# Patient Record
Sex: FEMALE | Race: WHITE | NOT HISPANIC OR LATINO | Employment: OTHER | URBAN - METROPOLITAN AREA
[De-identification: names, ages, dates, MRNs, and addresses within clinical notes are randomized per-mention and may not be internally consistent; named-entity substitution may affect disease eponyms.]

---

## 2018-10-03 ENCOUNTER — TRANSCRIBE ORDERS (OUTPATIENT)
Dept: LAB | Facility: CLINIC | Age: 83
End: 2018-10-03

## 2018-10-03 ENCOUNTER — APPOINTMENT (OUTPATIENT)
Dept: LAB | Facility: CLINIC | Age: 83
End: 2018-10-03
Payer: MEDICARE

## 2018-10-03 DIAGNOSIS — E03.9 HYPOTHYROIDISM, UNSPECIFIED TYPE: ICD-10-CM

## 2018-10-03 DIAGNOSIS — R29.6 FALL IN ELDERLY PATIENT: ICD-10-CM

## 2018-10-03 DIAGNOSIS — E13.8 DIABETES MELLITUS OF OTHER TYPE WITH COMPLICATION, UNSPECIFIED WHETHER LONG TERM INSULIN USE: ICD-10-CM

## 2018-10-03 DIAGNOSIS — E13.8 DIABETES MELLITUS OF OTHER TYPE WITH COMPLICATION, UNSPECIFIED WHETHER LONG TERM INSULIN USE: Primary | ICD-10-CM

## 2018-10-03 DIAGNOSIS — I10 HYPERTENSION, UNSPECIFIED TYPE: ICD-10-CM

## 2018-10-03 LAB
ALBUMIN SERPL BCP-MCNC: 3.5 G/DL (ref 3.5–5)
ALP SERPL-CCNC: 78 U/L (ref 46–116)
ALT SERPL W P-5'-P-CCNC: 37 U/L (ref 12–78)
ANION GAP SERPL CALCULATED.3IONS-SCNC: 7 MMOL/L (ref 4–13)
AST SERPL W P-5'-P-CCNC: 24 U/L (ref 5–45)
BILIRUB SERPL-MCNC: 0.7 MG/DL (ref 0.2–1)
BUN SERPL-MCNC: 20 MG/DL (ref 5–25)
CALCIUM SERPL-MCNC: 8.9 MG/DL (ref 8.3–10.1)
CHLORIDE SERPL-SCNC: 103 MMOL/L (ref 100–108)
CO2 SERPL-SCNC: 28 MMOL/L (ref 21–32)
CREAT SERPL-MCNC: 1.57 MG/DL (ref 0.6–1.3)
ERYTHROCYTE [DISTWIDTH] IN BLOOD BY AUTOMATED COUNT: 14.1 % (ref 11.6–15.1)
EST. AVERAGE GLUCOSE BLD GHB EST-MCNC: 186 MG/DL
GFR SERPL CREATININE-BSD FRML MDRD: 30 ML/MIN/1.73SQ M
GLUCOSE SERPL-MCNC: 227 MG/DL (ref 65–140)
HBA1C MFR BLD: 8.1 % (ref 4.2–6.3)
HCT VFR BLD AUTO: 36.1 % (ref 34.8–46.1)
HGB BLD-MCNC: 11.7 G/DL (ref 11.5–15.4)
MCH RBC QN AUTO: 31 PG (ref 26.8–34.3)
MCHC RBC AUTO-ENTMCNC: 32.4 G/DL (ref 31.4–37.4)
MCV RBC AUTO: 96 FL (ref 82–98)
PLATELET # BLD AUTO: 272 THOUSANDS/UL (ref 149–390)
PMV BLD AUTO: 11.9 FL (ref 8.9–12.7)
POTASSIUM SERPL-SCNC: 4.7 MMOL/L (ref 3.5–5.3)
PROT SERPL-MCNC: 7.3 G/DL (ref 6.4–8.2)
RBC # BLD AUTO: 3.77 MILLION/UL (ref 3.81–5.12)
SODIUM SERPL-SCNC: 138 MMOL/L (ref 136–145)
TSH SERPL DL<=0.05 MIU/L-ACNC: 1.24 UIU/ML (ref 0.36–3.74)
WBC # BLD AUTO: 7.62 THOUSAND/UL (ref 4.31–10.16)

## 2018-10-03 PROCEDURE — 85027 COMPLETE CBC AUTOMATED: CPT

## 2018-10-03 PROCEDURE — 80053 COMPREHEN METABOLIC PANEL: CPT

## 2018-10-03 PROCEDURE — 36415 COLL VENOUS BLD VENIPUNCTURE: CPT

## 2018-10-03 PROCEDURE — 84443 ASSAY THYROID STIM HORMONE: CPT

## 2018-10-03 PROCEDURE — 83036 HEMOGLOBIN GLYCOSYLATED A1C: CPT

## 2020-11-14 PROCEDURE — 87635 SARS-COV-2 COVID-19 AMP PRB: CPT | Performed by: INTERNAL MEDICINE

## 2020-11-15 ENCOUNTER — LAB REQUISITION (OUTPATIENT)
Dept: LAB | Facility: HOSPITAL | Age: 85
End: 2020-11-15
Payer: MEDICARE

## 2020-11-15 DIAGNOSIS — U07.1 COVID-19: ICD-10-CM

## 2020-11-15 LAB — SARS-COV-2 RNA RESP QL NAA+PROBE: NEGATIVE

## 2020-11-16 ENCOUNTER — TELEPHONE (OUTPATIENT)
Dept: UROLOGY | Facility: CLINIC | Age: 85
End: 2020-11-16

## 2020-11-17 ENCOUNTER — TELEPHONE (OUTPATIENT)
Dept: UROLOGY | Facility: CLINIC | Age: 85
End: 2020-11-17

## 2020-12-18 ENCOUNTER — LAB REQUISITION (OUTPATIENT)
Dept: LAB | Facility: HOSPITAL | Age: 85
End: 2020-12-18
Payer: MEDICARE

## 2020-12-18 DIAGNOSIS — U07.1 COVID-19: ICD-10-CM

## 2020-12-18 PROCEDURE — U0003 INFECTIOUS AGENT DETECTION BY NUCLEIC ACID (DNA OR RNA); SEVERE ACUTE RESPIRATORY SYNDROME CORONAVIRUS 2 (SARS-COV-2) (CORONAVIRUS DISEASE [COVID-19]), AMPLIFIED PROBE TECHNIQUE, MAKING USE OF HIGH THROUGHPUT TECHNOLOGIES AS DESCRIBED BY CMS-2020-01-R: HCPCS | Performed by: INTERNAL MEDICINE

## 2020-12-19 LAB — SARS-COV-2 RNA SPEC QL NAA+PROBE: NOT DETECTED

## 2021-12-03 ENCOUNTER — HOSPITAL ENCOUNTER (EMERGENCY)
Facility: HOSPITAL | Age: 86
Discharge: HOME | End: 2021-12-03
Attending: EMERGENCY MEDICINE | Admitting: EMERGENCY MEDICINE
Payer: MEDICARE

## 2021-12-03 ENCOUNTER — APPOINTMENT (EMERGENCY)
Dept: CT IMAGING | Facility: HOSPITAL | Age: 86
End: 2021-12-03
Payer: MEDICARE

## 2021-12-03 ENCOUNTER — APPOINTMENT (EMERGENCY)
Dept: RADIOLOGY | Facility: HOSPITAL | Age: 86
End: 2021-12-03
Payer: MEDICARE

## 2021-12-03 VITALS
RESPIRATION RATE: 18 BRPM | DIASTOLIC BLOOD PRESSURE: 84 MMHG | HEART RATE: 72 BPM | SYSTOLIC BLOOD PRESSURE: 213 MMHG | OXYGEN SATURATION: 95 % | TEMPERATURE: 97.8 F
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
## 2021-12-03 DIAGNOSIS — M25.562 LEFT KNEE PAIN: ICD-10-CM

## 2021-12-03 DIAGNOSIS — S00.81XA ABRASION OF FOREHEAD, INITIAL ENCOUNTER: ICD-10-CM

## 2021-12-03 PROCEDURE — 73564 X-RAY EXAM KNEE 4 OR MORE: CPT

## 2021-12-03 PROCEDURE — 73502 X-RAY EXAM HIP UNI 2-3 VIEWS: CPT

## 2021-12-03 PROCEDURE — 70450 CT HEAD/BRAIN W/O DYE: CPT

## 2021-12-03 PROCEDURE — 90471 IMMUNIZATION ADMIN: CPT

## 2021-12-03 PROCEDURE — 90715 TDAP VACCINE 7 YRS/> IM: CPT | Performed by: EMERGENCY MEDICINE

## 2021-12-03 PROCEDURE — 72125 CT NECK SPINE W/O DYE: CPT

## 2021-12-03 PROCEDURE — 99284 EMERGENCY DEPT VISIT MOD MDM: CPT

## 2021-12-03 PROCEDURE — 99284 EMERGENCY DEPT VISIT MOD MDM: CPT | Performed by: EMERGENCY MEDICINE

## 2021-12-03 RX ADMIN — TETANUS TOXOID, REDUCED DIPHTHERIA TOXOID AND ACELLULAR PERTUSSIS VACCINE, ADSORBED 0.5 ML: 5; 2.5; 8; 8; 2.5 SUSPENSION INTRAMUSCULAR at 02:56

## 2022-05-19 ENCOUNTER — HOSPITAL ENCOUNTER (EMERGENCY)
Facility: HOSPITAL | Age: 87
Discharge: HOME | End: 2022-05-19
Attending: EMERGENCY MEDICINE
Payer: MEDICARE

## 2022-05-19 ENCOUNTER — APPOINTMENT (EMERGENCY)
Dept: RADIOLOGY | Facility: HOSPITAL | Age: 87
End: 2022-05-19
Payer: MEDICARE

## 2022-05-19 ENCOUNTER — APPOINTMENT (EMERGENCY)
Dept: CT IMAGING | Facility: HOSPITAL | Age: 87
End: 2022-05-19
Payer: MEDICARE

## 2022-05-19 VITALS
RESPIRATION RATE: 18 BRPM | OXYGEN SATURATION: 96 % | HEART RATE: 66 BPM | SYSTOLIC BLOOD PRESSURE: 173 MMHG | DIASTOLIC BLOOD PRESSURE: 92 MMHG | HEIGHT: 66 IN | BODY MASS INDEX: 25.71 KG/M2 | WEIGHT: 160 LBS

## 2022-05-19 DIAGNOSIS — M25.511 BILATERAL SHOULDER PAIN: ICD-10-CM

## 2022-05-19 DIAGNOSIS — M25.512 BILATERAL SHOULDER PAIN: ICD-10-CM
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
## 2022-05-19 LAB
GLUCOSE SERPL-MCNC: 120 MG/DL (ref 65–140)
GLUCOSE SERPL-MCNC: 93 MG/DL (ref 65–140)

## 2022-05-19 PROCEDURE — 71045 X-RAY EXAM CHEST 1 VIEW: CPT

## 2022-05-19 PROCEDURE — 82948 REAGENT STRIP/BLOOD GLUCOSE: CPT

## 2022-05-19 PROCEDURE — 73030 X-RAY EXAM OF SHOULDER: CPT

## 2022-05-19 PROCEDURE — 99284 EMERGENCY DEPT VISIT MOD MDM: CPT

## 2022-05-19 PROCEDURE — 70450 CT HEAD/BRAIN W/O DYE: CPT

## 2022-05-19 PROCEDURE — 99282 EMERGENCY DEPT VISIT SF MDM: CPT | Performed by: PHYSICIAN ASSISTANT

## 2022-05-19 RX ORDER — POTASSIUM CHLORIDE 750 MG/1
10 TABLET, FILM COATED, EXTENDED RELEASE ORAL DAILY
COMMUNITY

## 2022-05-19 RX ORDER — GABAPENTIN 100 MG/1
100 CAPSULE ORAL 3 TIMES DAILY
COMMUNITY

## 2022-05-19 RX ORDER — LEVOTHYROXINE SODIUM 0.05 MG/1
50 TABLET ORAL DAILY
COMMUNITY

## 2022-05-19 RX ORDER — PIOGLITAZONEHYDROCHLORIDE 15 MG/1
15 TABLET ORAL DAILY
COMMUNITY
End: 2022-06-28

## 2022-05-19 RX ORDER — LOSARTAN POTASSIUM 50 MG/1
50 TABLET ORAL DAILY
COMMUNITY

## 2022-05-19 RX ORDER — FOLIC ACID 1 MG/1
TABLET ORAL DAILY
COMMUNITY

## 2022-05-20 NOTE — ED PROVIDER NOTES
History  Chief Complaint   Patient presents with   Suzanne Marcos fall at nursing home  May have hit her head  No thinners or ASA  EMS found blood sugar 42 and administered oral glucose  Repeat in the 50's, then repeated glucose  Per EMS patient much more alert and speaking more clearly  Patient is ZAINAB     63-year-old female presents to the emergency department from a nursing facility concern for a possible fall  Patient does have some dementia and is pleasantly confused at baseline  EMS reported to have found the patient's blood sugar to be in the 40s which improved with oral glucose  Patient is well-appearing in no acute distress  There is a small isolated skin tear to the right forearm  Superficial abrasion to the forehead  Patient does not take any blood thinning medications  Patient also expresses concern for bilateral shoulder discomfort  Allergies reviewed          Prior to Admission Medications   Prescriptions Last Dose Informant Patient Reported? Taking? Bioflavonoid Products (WHITNEY C PO)   Yes Yes   Sig: Take 1,000 mg by mouth in the morning   folic acid (FOLVITE) 1 mg tablet   Yes Yes   Sig: Take by mouth daily   gabapentin (NEURONTIN) 100 mg capsule   Yes Yes   Sig: Take 100 mg by mouth in the morning and 100 mg in the evening and 100 mg before bedtime  insulin detemir (LEVEMIR) 100 units/mL subcutaneous injection   Yes Yes   Sig: Inject 25 Units under the skin daily at bedtime   levothyroxine 50 mcg tablet   Yes Yes   Sig: Take 50 mcg by mouth in the morning  losartan (COZAAR) 50 mg tablet   Yes Yes   Sig: Take 50 mg by mouth in the morning  methotrexate 2 5 mg tablet   Yes Yes   Sig: Take 2 5 mg by mouth once a week Sundays   metoprolol tartrate (LOPRESSOR) 25 mg tablet   Yes Yes   Sig: Take 25 mg by mouth every 12 (twelve) hours   pioglitazone (ACTOS) 15 mg tablet   Yes Yes   Sig: Take 15 mg by mouth in the morning     potassium chloride (Klor-Con) 10 mEq tablet   Yes Yes Sig: Take 10 mEq by mouth in the morning      Facility-Administered Medications: None       Past Medical History:   Diagnosis Date    Dementia ([facility] )     Diabetes mellitus ([place] [address] )     Disease of thyroid gland     Hypertension     Rheumatoid arthritis ([place] [address] )        History reviewed  No pertinent surgical history  History reviewed  No pertinent family history  I have reviewed and agree with the history as documented  E-Cigarette/Vaping    E-Cigarette Use Never User      E-Cigarette/Vaping Substances    Nicotine No     THC No     CBD No     Flavoring No     Other No     Unknown No      Social History     Tobacco Use    Smoking status: Never Smoker    Smokeless tobacco: Never Used   Vaping Use    Vaping Use: Never used   Substance Use Topics    Alcohol use: Not Currently    Drug use: Never       Review of Systems   Unable to perform ROS: Dementia       Physical Exam  Physical Exam  Vitals and nursing note reviewed  Constitutional:       General: She is not in acute distress  Appearance: She is well-developed  HENT:      Head: Normocephalic and atraumatic  Eyes:      Conjunctiva/sclera: Conjunctivae normal    Cardiovascular:      Rate and Rhythm: Normal rate and regular rhythm  Heart sounds: No murmur heard  Pulmonary:      Effort: Pulmonary effort is normal  No respiratory distress  Breath sounds: Normal breath sounds  Abdominal:      Palpations: Abdomen is soft  Tenderness: There is no abdominal tenderness  Musculoskeletal:      Cervical back: Neck supple  Skin:     General: Skin is warm and dry  Comments: Superficial skin tear to the right forearm  no active bleeding  Neurological:      Mental Status: She is alert           Vital Signs  ED Triage Vitals [05/19/22 0959]   Temp Pulse Respirations Blood Pressure SpO2   -- 66 18 (!) 173/92 96 %      Temp src Heart Rate Source Patient Position - Orthostatic VS BP Location FiO2 (%)   -- Monitor Sitting Left arm --      Pain Score       --           Vitals:    05/19/22 0959   BP: (!) 173/92   Pulse: 66   Patient Position - Orthostatic VS: Sitting         Visual Acuity      ED Medications  Medications - No data to display    Diagnostic Studies  Results Reviewed     Procedure Component Value Units Date/Time    Fingerstick Glucose (POCT) [[id number]]  (Normal) Collected: 05/19/22 1140    Lab Status: Final result Updated: 05/19/22 1143     POC Glucose 120 mg/dl     Fingerstick Glucose (POCT) [[id number]]  (Normal) Collected: 05/19/22 1049    Lab Status: Final result Updated: 05/19/22 1051     POC Glucose 93 mg/dl                  XR shoulder 2+ views RIGHT   Final Result by Adonis Johnson MD (05/19 1146)      No acute osseous abnormality  Degenerative changes as described  Workstation performed: [id number]         XR chest 1 view portable   Final Result by Adonis Johnson MD (05/19 1146)      No acute cardiopulmonary disease  Workstation performed: [id number]         XR shoulder 2+ views LEFT   Final Result by Adonis Johnson MD (05/19 1145)      No acute osseous abnormality  Degenerative changes as described  Workstation performed: [id number]         CT head without contrast   Final Result by Rochelle Roland MD (05/19 1048)      Intracranial hemorrhage or mass effect  Minimal frontal scalp soft tissue swelling  Workstation performed: [id number]                    Procedures  Procedures         ED Course                               SBIRT 22yo+    Flowsheet Row Most Recent Value   SBIRT (25 yo +)    In order to provide better care to our patients, we are screening all of our patients for alcohol and drug use  Would it be okay to ask you these screening questions? Yes Filed at: 05/19/2022 1027   Initial Alcohol Screen: US AUDIT-C     1  How often do you have a drink containing alcohol? 0 Filed at: 05/19/2022 1027   2   How many drinks containing alcohol do you have on a typical day you are drinking? 0 Filed at: 05/19/2022 1027   3a  Male UNDER 65: How often do you have five or more drinks on one occasion? 0 Filed at: 05/19/2022 1027   3b  FEMALE Any Age, or MALE 65+: How often do you have 4 or more drinks on one occassion? 0 Filed at: 05/19/2022 1027   Audit-C Score 0 Filed at: 05/19/2022 1027   RAMIREZ: How many times in the past year have you    Used an illegal drug or used a prescription medication for non-medical reasons? Never Filed at: 05/19/2022 1027                    MDM  Number of Diagnoses or Management Options  Bilateral shoulder pain  Fall, initial encounter  Diagnosis management comments: Patient has arthritis of the bilateral shoulders  Superficial skin tear of the right forearm  Patient is well-appearing to reported baseline otherwise  Patient is suitable for discharge back to nursing facility  Patient educated regarding their diagnosis and given return and follow-up instructions  Patient was advised to returned to the ED with worsening symptoms or concerns  Patient is understanding of and in agreement with the treatment plan  There are no questions at the time of discharge  Risk of Complications, Morbidity, and/or Mortality  Presenting problems: low  Diagnostic procedures: low  Management options: low    Patient Progress  Patient progress: stable      Disposition  Final diagnoses:   Fall, initial encounter   Bilateral shoulder pain     Time reflects when diagnosis was documented in both MDM as applicable and the Disposition within this note     Time User Action Codes Description Comment    5/19/2022 10:59 AM Tonja Butler Add [Z24  XXXA] Fall, initial encounter     5/19/2022 10:59 AM Tonja Butler Add [M25 511,  M25 512] Bilateral shoulder pain       ED Disposition     ED Disposition   Discharge    Condition   Stable    Date/Time   Thu May 19, 2022 11:46 AM    Comment   Kiya Celis discharge to home/self care                 Follow-up Information    None         Discharge Medication List as of 5/19/2022 11:46 AM      CONTINUE these medications which have NOT CHANGED    Details   Bioflavonoid Products (WHITNEY C PO) Take 1,000 mg by mouth in the morning, Historical Med      folic acid (FOLVITE) 1 mg tablet Take by mouth daily, Historical Med      gabapentin (NEURONTIN) 100 mg capsule Take 100 mg by mouth in the morning and 100 mg in the evening and 100 mg before bedtime  , Historical Med      insulin detemir (LEVEMIR) 100 units/mL subcutaneous injection Inject 25 Units under the skin daily at bedtime, Historical Med      levothyroxine 50 mcg tablet Take 50 mcg by mouth in the morning , Historical Med      losartan (COZAAR) 50 mg tablet Take 50 mg by mouth in the morning , Historical Med      methotrexate 2 5 mg tablet Take 2 5 mg by mouth once a week Sundays, Historical Med      metoprolol tartrate (LOPRESSOR) 25 mg tablet Take 25 mg by mouth every 12 (twelve) hours, Historical Med      pioglitazone (ACTOS) 15 mg tablet Take 15 mg by mouth in the morning , Historical Med      potassium chloride (Klor-Con) 10 mEq tablet Take 10 mEq by mouth in the morning, Historical Med             No discharge procedures on file      PDMP Review     None          ED Provider  Electronically Signed by           Ted Huertas PA-C  05/20/22 8824

## 2022-06-09 PROCEDURE — U0003 INFECTIOUS AGENT DETECTION BY NUCLEIC ACID (DNA OR RNA); SEVERE ACUTE RESPIRATORY SYNDROME CORONAVIRUS 2 (SARS-COV-2) (CORONAVIRUS DISEASE [COVID-19]), AMPLIFIED PROBE TECHNIQUE, MAKING USE OF HIGH THROUGHPUT TECHNOLOGIES AS DESCRIBED BY CMS-2020-01-R: HCPCS | Performed by: INTERNAL MEDICINE

## 2022-06-09 PROCEDURE — U0005 INFEC AGEN DETEC AMPLI PROBE: HCPCS | Performed by: INTERNAL MEDICINE

## 2022-06-10 ENCOUNTER — LAB REQUISITION (OUTPATIENT)
Dept: LAB | Facility: HOSPITAL | Age: 87
End: 2022-06-10
Payer: MEDICARE

## 2022-06-10 DIAGNOSIS — Z03.818 ENCOUNTER FOR OBSERVATION FOR SUSPECTED EXPOSURE TO OTHER BIOLOGICAL AGENTS RULED OUT: ICD-10-CM

## 2022-06-10 DIAGNOSIS — Z11.59 ENCOUNTER FOR SCREENING FOR OTHER VIRAL DISEASES: ICD-10-CM

## 2022-06-10 LAB — SARS-COV-2 RNA RESP QL NAA+PROBE: NEGATIVE

## 2022-06-15 PROCEDURE — U0003 INFECTIOUS AGENT DETECTION BY NUCLEIC ACID (DNA OR RNA); SEVERE ACUTE RESPIRATORY SYNDROME CORONAVIRUS 2 (SARS-COV-2) (CORONAVIRUS DISEASE [COVID-19]), AMPLIFIED PROBE TECHNIQUE, MAKING USE OF HIGH THROUGHPUT TECHNOLOGIES AS DESCRIBED BY CMS-2020-01-R: HCPCS | Performed by: INTERNAL MEDICINE

## 2022-06-15 PROCEDURE — U0005 INFEC AGEN DETEC AMPLI PROBE: HCPCS | Performed by: INTERNAL MEDICINE

## 2022-06-16 ENCOUNTER — LAB REQUISITION (OUTPATIENT)
Dept: LAB | Facility: HOSPITAL | Age: 87
End: 2022-06-16
Payer: MEDICARE

## 2022-06-16 DIAGNOSIS — Z03.818 ENCOUNTER FOR OBSERVATION FOR SUSPECTED EXPOSURE TO OTHER BIOLOGICAL AGENTS RULED OUT: ICD-10-CM

## 2022-06-16 DIAGNOSIS — Z11.59 ENCOUNTER FOR SCREENING FOR OTHER VIRAL DISEASES: ICD-10-CM

## 2022-06-16 LAB — SARS-COV-2 RNA RESP QL NAA+PROBE: NEGATIVE

## 2022-06-27 ENCOUNTER — APPOINTMENT (EMERGENCY)
Dept: RADIOLOGY | Facility: HOSPITAL | Age: 87
End: 2022-06-27
Payer: MEDICARE

## 2022-06-27 ENCOUNTER — HOSPITAL ENCOUNTER (OUTPATIENT)
Facility: HOSPITAL | Age: 87
Setting detail: OBSERVATION
Discharge: HOME HEALTH | End: 2022-06-28
Attending: EMERGENCY MEDICINE | Admitting: STUDENT IN AN ORGANIZED HEALTH CARE EDUCATION/TRAINING PROGRAM
Payer: MEDICARE

## 2022-06-27 DIAGNOSIS — S31.000A WOUND OF SACRAL REGION, INITIAL ENCOUNTER: ICD-10-CM

## 2022-06-27 DIAGNOSIS — E11.649 HYPOGLYCEMIA DUE TO TYPE 2 DIABETES MELLITUS (HCC): ICD-10-CM

## 2022-06-27 DIAGNOSIS — R41.82 ALTERED MENTAL STATUS: ICD-10-CM

## 2022-06-27 DIAGNOSIS — D64.9 ANEMIA: ICD-10-CM

## 2022-06-27 DIAGNOSIS — E16.2 HYPOGLYCEMIA: Primary | ICD-10-CM

## 2022-06-27 DIAGNOSIS — N30.00 ACUTE CYSTITIS WITHOUT HEMATURIA: ICD-10-CM

## 2022-06-27 PROBLEM — I10 HYPERTENSION: Status: ACTIVE | Noted: 2022-06-27

## 2022-06-27 PROBLEM — N39.0 UTI (URINARY TRACT INFECTION): Status: ACTIVE | Noted: 2022-06-27

## 2022-06-27 PROBLEM — N18.9 ACUTE KIDNEY INJURY SUPERIMPOSED ON CHRONIC KIDNEY DISEASE (HCC): Status: ACTIVE | Noted: 2022-06-27

## 2022-06-27 PROBLEM — F03.90 DEMENTIA (HCC): Status: ACTIVE | Noted: 2022-06-27

## 2022-06-27 PROBLEM — M06.9 RHEUMATOID ARTHRITIS (HCC): Status: ACTIVE | Noted: 2022-06-27

## 2022-06-27 PROBLEM — N17.9 ACUTE KIDNEY INJURY SUPERIMPOSED ON CHRONIC KIDNEY DISEASE (HCC): Status: ACTIVE | Noted: 2022-06-27

## 2022-06-27 LAB
2HR DELTA HS TROPONIN: -1 NG/L
4HR DELTA HS TROPONIN: -7 NG/L
ANION GAP SERPL CALCULATED.3IONS-SCNC: 5 MMOL/L (ref 4–13)
ATRIAL RATE: 65 BPM
ATRIAL RATE: 69 BPM
ATRIAL RATE: 71 BPM
BACTERIA UR QL AUTO: ABNORMAL /HPF
BASOPHILS # BLD AUTO: 0.02 THOUSANDS/ΜL (ref 0–0.1)
BASOPHILS NFR BLD AUTO: 0 % (ref 0–1)
BILIRUB UR QL STRIP: NEGATIVE
BUN SERPL-MCNC: 44 MG/DL (ref 5–25)
CALCIUM SERPL-MCNC: 8.2 MG/DL (ref 8.4–10.2)
CARDIAC TROPONIN I PNL SERPL HS: 66 NG/L
CARDIAC TROPONIN I PNL SERPL HS: 72 NG/L
CARDIAC TROPONIN I PNL SERPL HS: 73 NG/L
CHLORIDE SERPL-SCNC: 106 MMOL/L (ref 96–108)
CLARITY UR: ABNORMAL
CO2 SERPL-SCNC: 25 MMOL/L (ref 21–32)
COLOR UR: ABNORMAL
CREAT SERPL-MCNC: 1.98 MG/DL (ref 0.6–1.3)
EOSINOPHIL # BLD AUTO: 0 THOUSAND/ΜL (ref 0–0.61)
EOSINOPHIL NFR BLD AUTO: 0 % (ref 0–6)
ERYTHROCYTE [DISTWIDTH] IN BLOOD BY AUTOMATED COUNT: 16.2 % (ref 11.6–15.1)
EST. AVERAGE GLUCOSE BLD GHB EST-MCNC: 169 MG/DL
FERRITIN SERPL-MCNC: 202 NG/ML (ref 8–388)
FOLATE SERPL-MCNC: >20 NG/ML (ref 3.1–17.5)
GFR SERPL CREATININE-BSD FRML MDRD: 22 ML/MIN/1.73SQ M
GLUCOSE SERPL-MCNC: 125 MG/DL (ref 65–140)
GLUCOSE SERPL-MCNC: 133 MG/DL (ref 65–140)
GLUCOSE SERPL-MCNC: 137 MG/DL (ref 65–140)
GLUCOSE SERPL-MCNC: 202 MG/DL (ref 65–140)
GLUCOSE SERPL-MCNC: 217 MG/DL (ref 65–140)
GLUCOSE SERPL-MCNC: 251 MG/DL (ref 65–140)
GLUCOSE SERPL-MCNC: 275 MG/DL (ref 65–140)
GLUCOSE SERPL-MCNC: 280 MG/DL (ref 65–140)
GLUCOSE SERPL-MCNC: 61 MG/DL (ref 65–140)
GLUCOSE UR STRIP-MCNC: NEGATIVE MG/DL
HBA1C MFR BLD: 7.5 %
HCT VFR BLD AUTO: 28.3 % (ref 34.8–46.1)
HGB BLD-MCNC: 8.6 G/DL (ref 11.5–15.4)
HGB UR QL STRIP.AUTO: NEGATIVE
IMM GRANULOCYTES # BLD AUTO: 0.13 THOUSAND/UL (ref 0–0.2)
IMM GRANULOCYTES NFR BLD AUTO: 2 % (ref 0–2)
IRON SATN MFR SERPL: 13 % (ref 15–50)
IRON SERPL-MCNC: 32 UG/DL (ref 50–170)
KETONES UR STRIP-MCNC: NEGATIVE MG/DL
LEUKOCYTE ESTERASE UR QL STRIP: ABNORMAL
LYMPHOCYTES # BLD AUTO: 0.83 THOUSANDS/ΜL (ref 0.6–4.47)
LYMPHOCYTES NFR BLD AUTO: 11 % (ref 14–44)
MCH RBC QN AUTO: 28.5 PG (ref 26.8–34.3)
MCHC RBC AUTO-ENTMCNC: 30.4 G/DL (ref 31.4–37.4)
MCV RBC AUTO: 94 FL (ref 82–98)
MONOCYTES # BLD AUTO: 0.41 THOUSAND/ΜL (ref 0.17–1.22)
MONOCYTES NFR BLD AUTO: 6 % (ref 4–12)
NEUTROPHILS # BLD AUTO: 6.03 THOUSANDS/ΜL (ref 1.85–7.62)
NEUTS SEG NFR BLD AUTO: 81 % (ref 43–75)
NITRITE UR QL STRIP: POSITIVE
NON-SQ EPI CELLS URNS QL MICRO: ABNORMAL /HPF
NRBC BLD AUTO-RTO: 0 /100 WBCS
P AXIS: 72 DEGREES
P AXIS: 81 DEGREES
P AXIS: 87 DEGREES
PH UR STRIP.AUTO: 6 [PH]
PLATELET # BLD AUTO: 261 THOUSANDS/UL (ref 149–390)
PLATELET # BLD AUTO: 270 THOUSANDS/UL (ref 149–390)
PMV BLD AUTO: 9.6 FL (ref 8.9–12.7)
PMV BLD AUTO: 9.7 FL (ref 8.9–12.7)
POTASSIUM SERPL-SCNC: 4.1 MMOL/L (ref 3.5–5.3)
PR INTERVAL: 192 MS
PR INTERVAL: 194 MS
PR INTERVAL: 202 MS
PROT UR STRIP-MCNC: NEGATIVE MG/DL
QRS AXIS: -31 DEGREES
QRS AXIS: -31 DEGREES
QRS AXIS: -44 DEGREES
QRSD INTERVAL: 146 MS
QRSD INTERVAL: 150 MS
QRSD INTERVAL: 154 MS
QT INTERVAL: 478 MS
QT INTERVAL: 480 MS
QT INTERVAL: 490 MS
QTC INTERVAL: 509 MS
QTC INTERVAL: 512 MS
QTC INTERVAL: 521 MS
RBC # BLD AUTO: 3.02 MILLION/UL (ref 3.81–5.12)
RBC #/AREA URNS AUTO: ABNORMAL /HPF
SODIUM SERPL-SCNC: 136 MMOL/L (ref 135–147)
SP GR UR STRIP.AUTO: 1.02 (ref 1–1.03)
T WAVE AXIS: 35 DEGREES
T WAVE AXIS: 53 DEGREES
T WAVE AXIS: 62 DEGREES
TIBC SERPL-MCNC: 242 UG/DL (ref 250–450)
TSH SERPL DL<=0.05 MIU/L-ACNC: 1.66 UIU/ML (ref 0.45–4.5)
UROBILINOGEN UR QL STRIP.AUTO: 0.2 E.U./DL
VENTRICULAR RATE: 65 BPM
VENTRICULAR RATE: 69 BPM
VENTRICULAR RATE: 71 BPM
VIT B12 SERPL-MCNC: 331 PG/ML (ref 100–900)
WBC # BLD AUTO: 7.42 THOUSAND/UL (ref 4.31–10.16)
WBC #/AREA URNS AUTO: ABNORMAL /HPF

## 2022-06-27 PROCEDURE — 82948 REAGENT STRIP/BLOOD GLUCOSE: CPT

## 2022-06-27 PROCEDURE — 93010 ELECTROCARDIOGRAM REPORT: CPT | Performed by: INTERNAL MEDICINE

## 2022-06-27 PROCEDURE — 99285 EMERGENCY DEPT VISIT HI MDM: CPT

## 2022-06-27 PROCEDURE — 82746 ASSAY OF FOLIC ACID SERUM: CPT | Performed by: STUDENT IN AN ORGANIZED HEALTH CARE EDUCATION/TRAINING PROGRAM

## 2022-06-27 PROCEDURE — 87086 URINE CULTURE/COLONY COUNT: CPT | Performed by: EMERGENCY MEDICINE

## 2022-06-27 PROCEDURE — 87077 CULTURE AEROBIC IDENTIFY: CPT | Performed by: EMERGENCY MEDICINE

## 2022-06-27 PROCEDURE — 85025 COMPLETE CBC W/AUTO DIFF WBC: CPT | Performed by: EMERGENCY MEDICINE

## 2022-06-27 PROCEDURE — 99220 PR INITIAL OBSERVATION CARE/DAY 70 MINUTES: CPT | Performed by: STUDENT IN AN ORGANIZED HEALTH CARE EDUCATION/TRAINING PROGRAM

## 2022-06-27 PROCEDURE — 82728 ASSAY OF FERRITIN: CPT | Performed by: STUDENT IN AN ORGANIZED HEALTH CARE EDUCATION/TRAINING PROGRAM

## 2022-06-27 PROCEDURE — 93005 ELECTROCARDIOGRAM TRACING: CPT

## 2022-06-27 PROCEDURE — 71045 X-RAY EXAM CHEST 1 VIEW: CPT

## 2022-06-27 PROCEDURE — 36415 COLL VENOUS BLD VENIPUNCTURE: CPT | Performed by: EMERGENCY MEDICINE

## 2022-06-27 PROCEDURE — 87186 SC STD MICRODIL/AGAR DIL: CPT | Performed by: EMERGENCY MEDICINE

## 2022-06-27 PROCEDURE — 81001 URINALYSIS AUTO W/SCOPE: CPT | Performed by: EMERGENCY MEDICINE

## 2022-06-27 PROCEDURE — 99285 EMERGENCY DEPT VISIT HI MDM: CPT | Performed by: EMERGENCY MEDICINE

## 2022-06-27 PROCEDURE — 85049 AUTOMATED PLATELET COUNT: CPT | Performed by: STUDENT IN AN ORGANIZED HEALTH CARE EDUCATION/TRAINING PROGRAM

## 2022-06-27 PROCEDURE — 84484 ASSAY OF TROPONIN QUANT: CPT | Performed by: EMERGENCY MEDICINE

## 2022-06-27 PROCEDURE — 83540 ASSAY OF IRON: CPT | Performed by: STUDENT IN AN ORGANIZED HEALTH CARE EDUCATION/TRAINING PROGRAM

## 2022-06-27 PROCEDURE — 84443 ASSAY THYROID STIM HORMONE: CPT | Performed by: EMERGENCY MEDICINE

## 2022-06-27 PROCEDURE — 82607 VITAMIN B-12: CPT | Performed by: STUDENT IN AN ORGANIZED HEALTH CARE EDUCATION/TRAINING PROGRAM

## 2022-06-27 PROCEDURE — 80048 BASIC METABOLIC PNL TOTAL CA: CPT | Performed by: EMERGENCY MEDICINE

## 2022-06-27 PROCEDURE — 83550 IRON BINDING TEST: CPT | Performed by: STUDENT IN AN ORGANIZED HEALTH CARE EDUCATION/TRAINING PROGRAM

## 2022-06-27 PROCEDURE — 83036 HEMOGLOBIN GLYCOSYLATED A1C: CPT | Performed by: STUDENT IN AN ORGANIZED HEALTH CARE EDUCATION/TRAINING PROGRAM

## 2022-06-27 RX ORDER — LEVOTHYROXINE SODIUM 0.03 MG/1
50 TABLET ORAL
Status: DISCONTINUED | OUTPATIENT
Start: 2022-06-28 | End: 2022-06-28 | Stop reason: HOSPADM

## 2022-06-27 RX ORDER — HYDROXYCHLOROQUINE SULFATE 200 MG/1
200 TABLET, FILM COATED ORAL 2 TIMES DAILY WITH MEALS
COMMUNITY

## 2022-06-27 RX ORDER — HEPARIN SODIUM 5000 [USP'U]/ML
5000 INJECTION, SOLUTION INTRAVENOUS; SUBCUTANEOUS EVERY 8 HOURS SCHEDULED
Status: DISCONTINUED | OUTPATIENT
Start: 2022-06-27 | End: 2022-06-28 | Stop reason: HOSPADM

## 2022-06-27 RX ORDER — DEXTROSE MONOHYDRATE 100 MG/ML
250 INJECTION, SOLUTION INTRAVENOUS ONCE
Status: COMPLETED | OUTPATIENT
Start: 2022-06-27 | End: 2022-06-27

## 2022-06-27 RX ORDER — HYDRALAZINE HYDROCHLORIDE 20 MG/ML
10 INJECTION INTRAMUSCULAR; INTRAVENOUS EVERY 4 HOURS PRN
Status: DISCONTINUED | OUTPATIENT
Start: 2022-06-27 | End: 2022-06-28 | Stop reason: HOSPADM

## 2022-06-27 RX ORDER — INSULIN LISPRO 100 [IU]/ML
1-5 INJECTION, SOLUTION INTRAVENOUS; SUBCUTANEOUS
Status: DISCONTINUED | OUTPATIENT
Start: 2022-06-27 | End: 2022-06-28 | Stop reason: HOSPADM

## 2022-06-27 RX ORDER — IBUPROFEN 200 MG
200 TABLET ORAL EVERY 6 HOURS PRN
COMMUNITY
End: 2022-06-28

## 2022-06-27 RX ORDER — GABAPENTIN 100 MG/1
100 CAPSULE ORAL 3 TIMES DAILY
Status: DISCONTINUED | OUTPATIENT
Start: 2022-06-27 | End: 2022-06-28 | Stop reason: HOSPADM

## 2022-06-27 RX ORDER — CEFTRIAXONE 1 G/50ML
1000 INJECTION, SOLUTION INTRAVENOUS EVERY 24 HOURS
Status: DISCONTINUED | OUTPATIENT
Start: 2022-06-27 | End: 2022-06-27

## 2022-06-27 RX ORDER — FUROSEMIDE 20 MG/1
20 TABLET ORAL DAILY
COMMUNITY

## 2022-06-27 RX ORDER — CEFTRIAXONE 1 G/50ML
1000 INJECTION, SOLUTION INTRAVENOUS EVERY 24 HOURS
Status: DISCONTINUED | OUTPATIENT
Start: 2022-06-28 | End: 2022-06-28 | Stop reason: HOSPADM

## 2022-06-27 RX ORDER — ACETAMINOPHEN 500 MG
500 TABLET ORAL EVERY 6 HOURS PRN
COMMUNITY

## 2022-06-27 RX ORDER — POTASSIUM CHLORIDE 20MEQ/15ML
10 LIQUID (ML) ORAL DAILY
Status: DISCONTINUED | OUTPATIENT
Start: 2022-06-27 | End: 2022-06-28 | Stop reason: HOSPADM

## 2022-06-27 RX ORDER — PIOGLITAZONEHYDROCHLORIDE 15 MG/1
15 TABLET ORAL DAILY
Status: DISCONTINUED | OUTPATIENT
Start: 2022-06-27 | End: 2022-06-28

## 2022-06-27 RX ORDER — FOLIC ACID 1 MG/1
1 TABLET ORAL DAILY
Status: DISCONTINUED | OUTPATIENT
Start: 2022-06-27 | End: 2022-06-28 | Stop reason: HOSPADM

## 2022-06-27 RX ORDER — BLOOD-GLUCOSE METER
KIT MISCELLANEOUS
COMMUNITY
Start: 2022-02-18 | End: 2022-06-28 | Stop reason: SDUPTHER

## 2022-06-27 RX ORDER — LOSARTAN POTASSIUM 50 MG/1
50 TABLET ORAL DAILY
Status: DISCONTINUED | OUTPATIENT
Start: 2022-06-27 | End: 2022-06-27

## 2022-06-27 RX ORDER — FUROSEMIDE 40 MG/1
20 TABLET ORAL DAILY
Status: DISCONTINUED | OUTPATIENT
Start: 2022-06-27 | End: 2022-06-27

## 2022-06-27 RX ADMIN — DEXTROSE MONOHYDRATE 250 ML: 100 INJECTION, SOLUTION INTRAVENOUS at 09:09

## 2022-06-27 RX ADMIN — PIOGLITAZONE 15 MG: 15 TABLET ORAL at 12:04

## 2022-06-27 RX ADMIN — METOPROLOL TARTRATE 25 MG: 25 TABLET, FILM COATED ORAL at 12:04

## 2022-06-27 RX ADMIN — INSULIN LISPRO 2 UNITS: 100 INJECTION, SOLUTION INTRAVENOUS; SUBCUTANEOUS at 17:28

## 2022-06-27 RX ADMIN — HEPARIN SODIUM 5000 UNITS: 5000 INJECTION INTRAVENOUS; SUBCUTANEOUS at 15:38

## 2022-06-27 RX ADMIN — GABAPENTIN 100 MG: 100 CAPSULE ORAL at 21:35

## 2022-06-27 RX ADMIN — INSULIN DETEMIR 5 UNITS: 100 INJECTION, SOLUTION SUBCUTANEOUS at 21:36

## 2022-06-27 RX ADMIN — GABAPENTIN 100 MG: 100 CAPSULE ORAL at 17:00

## 2022-06-27 RX ADMIN — POTASSIUM CHLORIDE 10 MEQ: 20 SOLUTION ORAL at 12:09

## 2022-06-27 RX ADMIN — FOLIC ACID 1 MG: 1 TABLET ORAL at 12:05

## 2022-06-27 RX ADMIN — HEPARIN SODIUM 5000 UNITS: 5000 INJECTION INTRAVENOUS; SUBCUTANEOUS at 21:35

## 2022-06-27 RX ADMIN — GABAPENTIN 100 MG: 100 CAPSULE ORAL at 12:04

## 2022-06-27 RX ADMIN — INSULIN LISPRO 1 UNITS: 100 INJECTION, SOLUTION INTRAVENOUS; SUBCUTANEOUS at 12:03

## 2022-06-27 RX ADMIN — METOPROLOL TARTRATE 25 MG: 25 TABLET, FILM COATED ORAL at 21:47

## 2022-06-27 NOTE — ED NOTES
Pt ate and toelrated 100% of food and beverage provided to her (jello, 4 ozs OJ, and applesauce)  Assisted OOB to [facility] to void  Pt with noted incontinence of urine in diaper  Diaper changed and patient cleansed for same  Skin cream applied to sacral and buttock areas as she is notably red and has a small open area on her right sacrum        Isidro Squires RN  06/27/22 8067

## 2022-06-27 NOTE — ED PROVIDER NOTES
History  Chief Complaint   Patient presents with    Hypoglycemia - Symptomatic     Unesponsive at nursing home, BGL 27, EMS gave D10 and BGL improved to 150, pt becoming more responsive     63-year-old female presenting for reports of altered mental status from personal care facility  Reportedly found have a blood glucose of 27 this morning  EMS gave 250mLs of D10 with improvement of symptoms  Denies any other issues of sick symptoms  Prior to Admission Medications   Prescriptions Last Dose Informant Patient Reported? Taking? Bioflavonoid Products (HWITNEY C PO) 6/26/2022 at Unknown time  Yes Yes   Sig: Take 1,000 mg by mouth in the morning   Insulin Pen Needle 30G X 8 MM MISC   Yes Yes   Sig: TO BE USED WITH LEVEMIR INSULIN AT BEDTIME   Loperamide-Simethicone (IMODIUM MULTI-SYMPTOM RELIEF PO)   Yes Yes   Sig: Take 1 capsule by mouth Not to exceed 8 tablets/24 hours   acetaminophen (TYLENOL) 500 mg tablet   Yes Yes   Sig: Take 500 mg by mouth every 6 (six) hours as needed for mild pain   folic acid (FOLVITE) 1 mg tablet 6/26/2022 at Unknown time  Yes Yes   Sig: Take by mouth daily   furosemide (LASIX) 20 mg tablet 6/26/2022 at Unknown time  Yes Yes   Sig: Take 20 mg by mouth daily   gabapentin (NEURONTIN) 100 mg capsule 6/26/2022 at Unknown time  Yes Yes   Sig: Take 100 mg by mouth in the morning and 100 mg in the evening and 100 mg before bedtime     glucose blood (Glucocard Vital Test) test strip   Yes Yes   Sig: USE AS DIRECTED FOR BLOOD GLUCOSE TESTING 3 TIMES DAILY   hydroxychloroquine (PLAQUENIL) 200 mg tablet 6/26/2022 at Unknown time  Yes Yes   Sig: Take 200 mg by mouth 2 (two) times a day with meals   ibuprofen (MOTRIN) 200 mg tablet   Yes Yes   Sig: Take 200 mg by mouth every 6 (six) hours as needed for mild pain   insulin NPH-insulin regular (NovoLIN 70/30) 100 units/mL subcutaneous injection 6/26/2022 at Unknown time  Yes Yes   Sig: Inject under the skin 3 (three) times a day before meals Blood sugar check 3x daily and cover according to the following sliding scale:  <160+0 units  161-220=3 units  221-280= 6 units  281-340= 9 units  >341= 12 units   insulin detemir (LEVEMIR) 100 units/mL subcutaneous injection 6/26/2022 at Unknown time  Yes Yes   Sig: Inject 25 Units under the skin daily at bedtime   levothyroxine 50 mcg tablet 6/27/2022 at Unknown time  Yes Yes   Sig: Take 50 mcg by mouth in the morning  losartan (COZAAR) 50 mg tablet 6/26/2022 at Unknown time  Yes Yes   Sig: Take 50 mg by mouth in the morning  methotrexate 2 5 mg tablet 6/26/2022  Yes Yes   Sig: Take 2 5 mg by mouth once a week Sundays   metoprolol tartrate (LOPRESSOR) 25 mg tablet 6/26/2022 at Unknown time  Yes Yes   Sig: Take 25 mg by mouth every 12 (twelve) hours   pioglitazone (ACTOS) 15 mg tablet 6/26/2022 at Unknown time  Yes Yes   Sig: Take 15 mg by mouth in the morning  potassium chloride (Klor-Con) 10 mEq tablet 6/26/2022 at Unknown time  Yes Yes   Sig: Take 10 mEq by mouth in the morning      Facility-Administered Medications: None       Past Medical History:   Diagnosis Date    Dementia [facility] )     Diabetes mellitus ([facility] )     Disease of thyroid gland     Hypertension     Rheumatoid arthritis ([facility] )        History reviewed  No pertinent surgical history  History reviewed  No pertinent family history  I have reviewed and agree with the history as documented  E-Cigarette/Vaping    E-Cigarette Use Never User      E-Cigarette/Vaping Substances    Nicotine No     THC No     CBD No     Flavoring No     Other No     Unknown No      Social History     Tobacco Use    Smoking status: Never Smoker    Smokeless tobacco: Never Used   Vaping Use    Vaping Use: Never used   Substance Use Topics    Alcohol use: Not Currently    Drug use: Never       Review of Systems   Psychiatric/Behavioral: Positive for confusion  All other systems reviewed and are negative        Physical Exam  Physical Exam  Vitals and nursing note reviewed  Constitutional:       General: She is not in acute distress  Appearance: She is well-developed  She is not ill-appearing, toxic-appearing or diaphoretic  HENT:      Head: Normocephalic and atraumatic  Right Ear: External ear normal       Left Ear: External ear normal       Nose: Nose normal    Eyes:      General: No scleral icterus  Right eye: No discharge  Left eye: No discharge  Conjunctiva/sclera: Conjunctivae normal       Pupils: Pupils are equal, round, and reactive to light  Neck:      Vascular: No JVD  Trachea: No tracheal deviation  Cardiovascular:      Rate and Rhythm: Normal rate and regular rhythm  Heart sounds: Normal heart sounds  No murmur heard  No friction rub  No gallop  Pulmonary:      Effort: Pulmonary effort is normal  No respiratory distress  Breath sounds: Normal breath sounds  No stridor  No wheezing or rales  Abdominal:      General: Bowel sounds are normal  There is no distension  Palpations: Abdomen is soft  There is no mass  Tenderness: There is no abdominal tenderness  There is no guarding  Genitourinary:     Rectum: Normal  Guaiac result negative  No mass, tenderness, anal fissure, external hemorrhoid or internal hemorrhoid  Normal anal tone  Comments: Chaperone: Yuliet Garcia RN  Musculoskeletal:         General: No tenderness or deformity  Normal range of motion  Cervical back: Normal range of motion and neck supple  Skin:     General: Skin is warm and dry  Coloration: Skin is not pale  Findings: No erythema or rash  Neurological:      Mental Status: She is alert  Cranial Nerves: No cranial nerve deficit  Sensory: No sensory deficit  Motor: No abnormal muscle tone        Comments: Baseline dementia  Alert and oriented to person and place (unaware of year which per son is normal)  Moving all extremities equally and without difficulty  No CN deficits noted   Psychiatric:         Behavior: Behavior normal          Thought Content:  Thought content normal          Judgment: Judgment normal          Vital Signs  ED Triage Vitals   Temperature Pulse Respirations Blood Pressure SpO2   06/27/22 0658 06/27/22 0658 06/27/22 0658 06/27/22 0658 06/27/22 0658   97 6 °F (36 4 °C) 70 16 155/70 96 %      Temp Source Heart Rate Source Patient Position - Orthostatic VS BP Location FiO2 (%)   06/27/22 0658 06/27/22 0658 06/27/22 1000 06/27/22 1000 --   Oral Monitor Lying Left arm       Pain Score       06/27/22 0658       No Pain           Vitals:    06/27/22 0658 06/27/22 1000 06/27/22 1204 06/27/22 1215   BP: 155/70 (!) 174/73 (!) 194/82 (!) 194/82   Pulse: 70 62 68 69   Patient Position - Orthostatic VS:  Lying  Lying         Visual Acuity      ED Medications  Medications   metoprolol tartrate (LOPRESSOR) tablet 25 mg (25 mg Oral Given 6/27/22 1204)   pioglitazone (ACTOS) tablet 15 mg (15 mg Oral Given 6/27/22 1204)   potassium chloride oral solution 10 mEq (10 mEq Oral Given 6/27/22 1209)   levothyroxine tablet 50 mcg (has no administration in time range)   folic acid (FOLVITE) tablet 1 mg (1 mg Oral Given 6/27/22 1205)   gabapentin (NEURONTIN) capsule 100 mg (100 mg Oral Given 6/27/22 1204)   insulin detemir (LEVEMIR) subcutaneous injection 5 Units (has no administration in time range)   heparin (porcine) subcutaneous injection 5,000 Units (has no administration in time range)   cefTRIAXone (ROCEPHIN) IVPB (premix in dextrose) 1,000 mg 50 mL (has no administration in time range)   insulin lispro (HumaLOG) 100 units/mL subcutaneous injection 1-5 Units (1 Units Subcutaneous Given 6/27/22 1203)   insulin lispro (HumaLOG) 100 units/mL subcutaneous injection 1-5 Units (has no administration in time range)   hydrALAZINE (APRESOLINE) injection 10 mg (has no administration in time range)   dextrose infusion 10 % (0 mL Intravenous Stopped 6/27/22 1145)       Diagnostic Studies  Results Reviewed     Procedure Component Value Units Date/Time    HS Troponin I 4hr [[id number]]  (Abnormal) Collected: 06/27/22 1216    Lab Status: Final result Specimen: Blood from Arm, Left Updated: 06/27/22 1250     hs TnI 4hr 66 ng/L      Delta 4hr hsTnI -7 ng/L     Platelet count [[id number]]  (Normal) Collected: 06/27/22 1216    Lab Status: Final result Specimen: Blood from Arm, Left Updated: 06/27/22 1225     Platelets 497 Thousands/uL      MPV 9 6 fL     Hemoglobin A1C [[id number]] Collected: 06/27/22 1216    Lab Status: In process Specimen: Blood from Arm, Left Updated: 06/27/22 1222    Folate [[id number]] Collected: 06/27/22 1216    Lab Status: In process Specimen: Blood from Arm, Left Updated: 06/27/22 1222    Vitamin B12 [[id number]] Collected: 06/27/22 1216    Lab Status: In process Specimen: Blood from Arm, Left Updated: 06/27/22 1222    Iron Saturation % [[id number]] Collected: 06/27/22 1216    Lab Status: In process Specimen: Blood from Arm, Left Updated: 06/27/22 1222    Ferritin [[id number]] Collected: 06/27/22 1216    Lab Status: In process Specimen: Blood from Arm, Left Updated: 06/27/22 1222    Fingerstick Glucose (POCT) [[id number]]  (Abnormal) Collected: 06/27/22 1157    Lab Status: Final result Updated: 06/27/22 1158     POC Glucose 202 mg/dl     Fingerstick Glucose (POCT) [[id number]]  (Normal) Collected: 06/27/22 1112    Lab Status: Final result Updated: 06/27/22 1114     POC Glucose 125 mg/dl     Occult blood 1-3, stool [[id number]]     Lab Status: No result Specimen: Stool     Urine Microscopic [[id number]]  (Abnormal) Collected: 06/27/22 0930    Lab Status: Final result Specimen: Urine, Clean Catch Updated: 06/27/22 1003     RBC, UA 0-1 /hpf      WBC, UA 20-30 /hpf      Epithelial Cells Occasional /hpf      Bacteria, UA Innumerable /hpf     Urine culture [[id number]] Collected: 06/27/22 0930    Lab Status:  In process Specimen: Urine, Clean Catch Updated: 06/27/22 1003    HS Troponin I 2hr [[id number]]  (Abnormal) Collected: 06/27/22 0912    Lab Status: Final result Specimen: Blood from Arm, Left Updated: 06/27/22 1000     hs TnI 2hr 72 ng/L      Delta 2hr hsTnI -1 ng/L     UA w Reflex to Microscopic w Reflex to Culture [[id number]]  (Abnormal) Collected: 06/27/22 0930    Lab Status: Final result Specimen: Urine, Clean Catch Updated: 06/27/22 0941     Color, UA Straw     Clarity, UA Hazy     Specific Thermal, UA 1 020     pH, UA 6 0     Leukocytes, UA 1+     Nitrite, UA Positive     Protein, UA Negative mg/dl      Glucose, UA Negative mg/dl      Ketones, UA Negative mg/dl      Urobilinogen, UA 0 2 E U /dl      Bilirubin, UA Negative     Occult Blood, UA Negative    Fingerstick Glucose (POCT) [[id number]]  (Abnormal) Collected: 06/27/22 0901    Lab Status: Final result Updated: 06/27/22 0902     POC Glucose 61 mg/dl     TSH, 3rd generation with Free T4 reflex [[id number]]  (Normal) Collected: 06/27/22 0721    Lab Status: Final result Specimen: Blood from Arm, Left Updated: 06/27/22 0803     TSH 3RD GENERATON 1 661 uIU/mL     Narrative:      Patients undergoing fluorescein dye angiography may retain small amounts of fluorescein in the body for 48-72 hours post procedure  Samples containing fluorescein can produce falsely depressed TSH values  If the patient had this procedure,a specimen should be resubmitted post fluorescein clearance        HS Troponin 0hr (reflex protocol) [[id number]]  (Abnormal) Collected: 06/27/22 0721    Lab Status: Final result Specimen: Blood from Arm, Left Updated: 06/27/22 0754     hs TnI 0hr 73 ng/L     Basic metabolic panel [[id number]]  (Abnormal) Collected: 06/27/22 0721    Lab Status: Final result Specimen: Blood from Arm, Left Updated: 06/27/22 0748     Sodium 136 mmol/L      Potassium 4 1 mmol/L      Chloride 106 mmol/L      CO2 25 mmol/L      ANION GAP 5 mmol/L      BUN 44 mg/dL      Creatinine 1 98 mg/dL      Glucose 137 mg/dL      Calcium 8 2 mg/dL      eGFR 22 ml/min/1 73sq m     Narrative:      National Kidney Disease Foundation guidelines for Chronic Kidney Disease (CKD):     Stage 1 with normal or high GFR (GFR > 90 mL/min/1 73 square meters)    Stage 2 Mild CKD (GFR = 60-89 mL/min/1 73 square meters)    Stage 3A Moderate CKD (GFR = 45-59 mL/min/1 73 square meters)    Stage 3B Moderate CKD (GFR = 30-44 mL/min/1 73 square meters)    Stage 4 Severe CKD (GFR = 15-29 mL/min/1 73 square meters)    Stage 5 End Stage CKD (GFR <15 mL/min/1 73 square meters)  Note: GFR calculation is accurate only with a steady state creatinine    CBC and differential [[id number]]  (Abnormal) Collected: 06/27/22 0721    Lab Status: Final result Specimen: Blood from Arm, Left Updated: 06/27/22 0733     WBC 7 42 Thousand/uL      RBC 3 02 Million/uL      Hemoglobin 8 6 g/dL      Hematocrit 28 3 %      MCV 94 fL      MCH 28 5 pg      MCHC 30 4 g/dL      RDW 16 2 %      MPV 9 7 fL      Platelets 097 Thousands/uL      nRBC 0 /100 WBCs      Neutrophils Relative 81 %      Immat GRANS % 2 %      Lymphocytes Relative 11 %      Monocytes Relative 6 %      Eosinophils Relative 0 %      Basophils Relative 0 %      Neutrophils Absolute 6 03 Thousands/µL      Immature Grans Absolute 0 13 Thousand/uL      Lymphocytes Absolute 0 83 Thousands/µL      Monocytes Absolute 0 41 Thousand/µL      Eosinophils Absolute 0 00 Thousand/µL      Basophils Absolute 0 02 Thousands/µL     Fingerstick Glucose (POCT) [[id number]]  (Normal) Collected: 06/27/22 0655    Lab Status: Final result Updated: 06/27/22 0702     POC Glucose 133 mg/dl                  XR chest 1 view portable   Final Result by Bev Kinney MD (06/27 0993)      Question mild pulmonary venous congestion                    Workstation performed: [id number]                    Procedures  ECG 12 Lead Documentation Only    Date/Time: 6/27/2022 3:11 PM  Performed by: Graciela Torres DO  Authorized by: Graciela Torres DO     Interpretation: Interpretation: abnormal    Rate:     ECG rate:  71    ECG rate assessment: normal    Rhythm:     Rhythm: sinus rhythm    Ectopy:     Ectopy: none    QRS:     QRS axis:  Left    QRS intervals: Wide  Conduction:     Conduction: abnormal      Abnormal conduction: complete RBBB    ST segments:     ST segments:  Normal  T waves:     T waves: inverted      Inverted:  V1 and V2             ED Course  ED Course as of 06/27/22 1514   Mon Jun 27, 2022   Kiara Rodriguez number [phone] ([phone])  Spoke with: Wing William in this morning to check on her glucose and weren't able to get reading initially and on second machine sugar 27  Staff state that she used to snack a lot overnight but that she was spiking her blood sugar at night so they took away her snacks and now her blood sugar the last 2 days have been intermittently low  Denies any obvious sick symptoms or falls  0736 Hemoglobin(!): 8 6  Steady decreased over last 1 year   0800 Spoke with patient's son Madan Green Most Recent Value   Heart Score Risk Calculator    History 0 Filed at: 06/27/2022 1514   ECG 1 Filed at: 06/27/2022 1514   Age 2 Filed at: 06/27/2022 1514   Risk Factors 2 Filed at: 06/27/2022 1514   Troponin 2 Filed at: 06/27/2022 1514   HEART Score 7 Filed at: 06/27/2022 1514                        SBIRT 20yo+    Flowsheet Row Most Recent Value   SBIRT (25 yo +)    In order to provide better care to our patients, we are screening all of our patients for alcohol and drug use  Would it be okay to ask you these screening questions? Yes Filed at: 06/27/2022 4613   Initial Alcohol Screen: US AUDIT-C     1  How often do you have a drink containing alcohol? 0 Filed at: 06/27/2022 0726   2  How many drinks containing alcohol do you have on a typical day you are drinking? 0 Filed at: 06/27/2022 0726   3a  Male UNDER 65: How often do you have five or more drinks on one occasion?  0 Filed at: 06/27/2022 0726   3b  FEMALE Any Age, or MALE 65+: How often do you have 4 or more drinks on one occassion? 0 Filed at: 06/27/2022 0726   Audit-C Score 0 Filed at: 06/27/2022 2681   RAMIREZ: How many times in the past year have you    Used an illegal drug or used a prescription medication for non-medical reasons? Never Filed at: 06/27/2022 6694                    MDM  Number of Diagnoses or Management Options  Altered mental status  Anemia  Hypoglycemia  Diagnosis management comments: Spoke with patient's son and nursing facility  Patient with baseline dementia  Based on their description of patient she appears back to baseline  Patient was given D10 250 mL and with resolution of hypoglycemia  After continued evaluation patient noted to be hypoglycemic again into the 60s  Given another D10 and admitted at this time as patient with recurrent hypoglycemia  Disposition  Final diagnoses:   Hypoglycemia   Altered mental status   Anemia     Time reflects when diagnosis was documented in both MDM as applicable and the Disposition within this note     Time User Action Codes Description Comment    6/27/2022 10:25 AM Lanetta Stark Add [E16 2] Hypoglycemia     6/27/2022 10:25 AM Lanetta Stark Add [R41 82] Altered mental status     6/27/2022 10:25 AM Lanetta Stark Add [D64 9] Anemia       ED Disposition     ED Disposition   Admit    Condition   Stable    Date/Time   Mon Jun 27, 2022 10:25 AM    Comment   Case was discussed with CYRIL and the patient's admission status was agreed to be Admission Status: observation status to the service of Dr Marie Watson  Follow-up Information    None         Patient's Medications   Discharge Prescriptions    No medications on file       No discharge procedures on file      PDMP Review     None          ED Provider  Electronically Signed by           Kathy Dias DO  06/27/22 0816

## 2022-06-27 NOTE — ASSESSMENT & PLAN NOTE
Lab Results   Component Value Date    HGBA1C 9 4 (H) 04/20/2022       Recent Labs     06/27/22  0655 06/27/22  0901   POCGLU 133 61*       Blood Sugar Average: Last 72 hrs:  (P) 97     Patient presented with persistent hypoglycemia  -continue to monitor Accu-Cheks  -will decrease home Lantus to 5 units q h s   -sliding scale insulin and Accu-Cheks will inpatient

## 2022-06-27 NOTE — ED NOTES
Placed call to cafeteria staff as patient did not receive a lunch tray        Esperanza Martinez RN  06/27/22 5957

## 2022-06-27 NOTE — H&P
[facility] [birth year], 80 y o  female MRN: [id number]  Unit/Bed#: ED 21 Encounter: [id number]  Primary Care Provider: Cam Degroot DO   Date and time admitted to hospital: 6/27/2022  6:50 AM    * Hypoglycemia due to type 2 diabetes mellitus [facility])  Assessment & Plan  Lab Results   Component Value Date    HGBA1C 9 4 (H) 04/20/2022       Recent Labs     06/27/22  0655 06/27/22  0901   POCGLU 133 61*       Blood Sugar Average: Last 72 hrs:  (P) 97     Patient presented with persistent hypoglycemia  -continue to monitor Accu-Cheks  -will decrease home Lantus to 5 units q h s   -sliding scale insulin and Accu-Cheks will inpatient    Acute kidney injury superimposed on chronic kidney disease ([facility] )  Assessment & Plan  Lab Results   Component Value Date    EGFR 22 06/27/2022    EGFR 30 10/03/2018    CREATININE 1 98 (H) 06/27/2022    CREATININE 1 57 (H) 10/03/2018     Creatinine 1 98 on admission, baseline around 1 4-1 5  -hold home Lasix and losartan  -gently hydrate  -continue to monitor    UTI (urinary tract infection)  Assessment & Plan  UA suggestive of UTI  Patient denies symptoms at this time however is poor historian  -will start Rocephin 1 g IV Q 24 hours  -continue to monitor    Anemia  Assessment & Plan  Hemoglobin 8 6 on admission  Review prior records reveals hemoglobin of 9 9 April 2022  No active signs of bleeding  -check FOBTs  -continue to monitor  -check iron panel, B12, folate    Sacral wound  Assessment & Plan  -consult wound care    Hypertension  Assessment & Plan  -hold home losartan  -continue home metoprolol  -hydralazine 10 mg IV q 4 hours p r n   For systolic blood pressure greater than 170    Rheumatoid arthritis ([facility] )  Assessment & Plan  Discussed with son, he does not believe the patient is currently on methotrexate or Plaquenil  States he will bring records in from nursing home today  -hold Plaquenil and methotrexate at this time    Dementia [facility])  Assessment & Plan  Discussed with son, patient appears to be a baseline  Has poor short-term memory    VTE Pharmacologic Prophylaxis: VTE Score: 4 Moderate Risk (Score 3-4) - Pharmacological DVT Prophylaxis Ordered: heparin  Code Status: Level 3 - DNAR and DNI   Discussion with family:  Discussed with son, all questions answered    Anticipated Length of Stay: Patient will be admitted on an observation basis with an anticipated length of stay of less than 2 midnights secondary to Hypoglycemia and UTI  Total Time for Visit, including Counseling / Coordination of Care: 45 minutes Greater than 50% of this total time spent on direct patient counseling and coordination of care  Chief Complaint:  Altered mental status    History of Present Illness:  Verlan Fleischer is a 80 y o  female with a PMH of dementia, CKD, hypertension, type 2 diabetes insulin-dependent, who presents with altered mental status  Patient was found altered this morning at nursing home, blood glucose was checked which was reportedly 27 at that time  Patient was subsequently transferred to ED for further evaluation  Patient given IV dextrose, repeat blood glucose was 61  Patient is very poor historian (confirmed with son) however denies any symptoms at this time  Review of Systems:  Review of Systems   Unable to perform ROS: Dementia       Past Medical and Surgical History:   Past Medical History:   Diagnosis Date    Dementia ([facility] )     Diabetes mellitus ([facility] )     Disease of thyroid gland     Hypertension     Rheumatoid arthritis ([facility] )        History reviewed  No pertinent surgical history  Meds/Allergies:  Prior to Admission medications    Medication Sig Start Date End Date Taking?  Authorizing Provider   acetaminophen (TYLENOL) 500 mg tablet Take 500 mg by mouth every 6 (six) hours as needed for mild pain   Yes Historical Provider, MD   Bioflavonoid Products (WHITNEY C PO) Take 1,000 mg by mouth in the morning   Yes Historical Provider, MD   folic acid (FOLVITE) 1 mg tablet Take by mouth daily   Yes Historical Provider, MD   furosemide (LASIX) 20 mg tablet Take 20 mg by mouth daily   Yes Historical Provider, MD   gabapentin (NEURONTIN) 100 mg capsule Take 100 mg by mouth in the morning and 100 mg in the evening and 100 mg before bedtime  Yes Historical Provider, MD   glucose blood (Glucocard Vital Test) test strip USE AS DIRECTED FOR BLOOD GLUCOSE TESTING 3 TIMES DAILY 2/18/22  Yes Historical Provider, MD   hydroxychloroquine (PLAQUENIL) 200 mg tablet Take 200 mg by mouth 2 (two) times a day with meals   Yes Historical Provider, MD   ibuprofen (MOTRIN) 200 mg tablet Take 200 mg by mouth every 6 (six) hours as needed for mild pain   Yes Historical Provider, MD   insulin detemir (LEVEMIR) 100 units/mL subcutaneous injection Inject 25 Units under the skin daily at bedtime   Yes Historical Provider, MD   insulin NPH-insulin regular (NovoLIN 70/30) 100 units/mL subcutaneous injection Inject under the skin 3 (three) times a day before meals Blood sugar check 3x daily and cover according to the following sliding scale:  <160+0 units  161-220=3 units  221-280= 6 units  281-340= 9 units  >341= 12 units   Yes Historical Provider, MD   Insulin Pen Needle 30G X 8 MM MISC TO BE USED WITH LEVEMIR INSULIN AT BEDTIME 4/13/22  Yes Historical Provider, MD   levothyroxine 50 mcg tablet Take 50 mcg by mouth in the morning  Yes Historical Provider, MD   Loperamide-Simethicone (IMODIUM MULTI-SYMPTOM RELIEF PO) Take 1 capsule by mouth Not to exceed 8 tablets/24 hours   Yes Historical Provider, MD   losartan (COZAAR) 50 mg tablet Take 50 mg by mouth in the morning     Yes Historical Provider, MD   methotrexate 2 5 mg tablet Take 2 5 mg by mouth once a week Sundays   Yes Historical Provider, MD   metoprolol tartrate (LOPRESSOR) 25 mg tablet Take 25 mg by mouth every 12 (twelve) hours   Yes Historical Provider, MD   pioglitazone (ACTOS) 15 mg tablet Take 15 mg by mouth in the morning  Yes Historical Provider, MD   potassium chloride (Klor-Con) 10 mEq tablet Take 10 mEq by mouth in the morning   Yes Historical Provider, MD     I have reviewed home medications with patient personally  Allergies: Allergies   Allergen Reactions    Latex Other (See Comments)    Sulfa Antibiotics Other (See Comments)       Social History:  Marital Status:    Patient Pre-hospital Living Situation:  Nursing home  Patient Pre-hospital Level of Mobility: walks  Patient Pre-hospital Diet Restrictions:  None  Substance Use History:   Social History     Substance and Sexual Activity   Alcohol Use Not Currently     Social History     Tobacco Use   Smoking Status Never Smoker   Smokeless Tobacco Never Used     Social History     Substance and Sexual Activity   Drug Use Never       Family History:  History reviewed  No pertinent family history  Physical Exam:     Vitals:   Blood Pressure: (!) 174/73 (06/27/22 1000)  Pulse: 62 (06/27/22 1000)  Temperature: 97 6 °F (36 4 °C) (06/27/22 0658)  Temp Source: Oral (06/27/22 3686)  Respirations: 18 (06/27/22 1000)  Height: 5' 6" (167 6 cm) (06/27/22 1762)  Weight - Scale: 72 kg (158 lb 11 7 oz) (06/27/22 0658)  SpO2: 98 % (06/27/22 1000)    Physical Exam  HENT:      Head: Normocephalic  Cardiovascular:      Rate and Rhythm: Normal rate and regular rhythm  Pulses: Normal pulses  Heart sounds: Normal heart sounds  Pulmonary:      Effort: Pulmonary effort is normal       Breath sounds: Normal breath sounds  Abdominal:      General: Abdomen is flat  Bowel sounds are normal       Palpations: Abdomen is soft  Musculoskeletal:         General: Normal range of motion  Skin:     Comments: Sacral wound   Neurological:      Mental Status: She is alert  Mental status is at baseline  She is disoriented  Psychiatric:         Mood and Affect: Mood normal          Behavior: Behavior normal          Thought Content:  Thought content normal          Judgment: Judgment normal           Additional Data:     Lab Results:  Results from last 7 days   Lab Units 06/27/22  0721   WBC Thousand/uL 7 42   HEMOGLOBIN g/dL 8 6*   HEMATOCRIT % 28 3*   PLATELETS Thousands/uL 270   NEUTROS PCT % 81*   LYMPHS PCT % 11*   MONOS PCT % 6   EOS PCT % 0     Results from last 7 days   Lab Units 06/27/22  0721   SODIUM mmol/L 136   POTASSIUM mmol/L 4 1   CHLORIDE mmol/L 106   CO2 mmol/L 25   BUN mg/dL 44*   CREATININE mg/dL 1 98*   ANION GAP mmol/L 5   CALCIUM mg/dL 8 2*   GLUCOSE RANDOM mg/dL 137         Results from last 7 days   Lab Units 06/27/22  1112 06/27/22  0901 06/27/22  0655   POC GLUCOSE mg/dl 125 61* 133               Imaging: Reviewed radiology reports from this admission including: chest xray  XR chest 1 view portable   Final Result by Haja Hoang MD (06/27 9937)      Question mild pulmonary venous congestion  Workstation performed: [id number]             EKG and Other Studies Reviewed on Admission:   · EKG:  Pending    ** Please Note: This note has been constructed using a voice recognition system   ** Render Note In Bullet Format When Appropriate: No Show Aperture Variable?: Yes Detail Level: Detailed Duration Of Freeze Thaw-Cycle (Seconds): 0 Number Of Freeze-Thaw Cycles: 3 freeze-thaw cycles Consent: The patient's consent was obtained including but not limited to risks of crusting, scabbing, blistering, scarring, darker or lighter pigmentary change, recurrence, incomplete removal and infection. Post-Care Instructions: I reviewed with the patient in detail post-care instructions. Patient is to wear sunprotection, and avoid picking at any of the treated lesions. Pt may apply Vaseline to crusted or scabbing areas.

## 2022-06-27 NOTE — ED NOTES
Report Received from Deanna RN no additional questions at this time        Jeyson Brunson RN  06/27/22 8555

## 2022-06-27 NOTE — ASSESSMENT & PLAN NOTE
Hemoglobin 8 6 on admission    Review prior records reveals hemoglobin of 9 9 April 2022  No active signs of bleeding  -check FOBTs  -continue to monitor  -check iron panel, B12, folate

## 2022-06-27 NOTE — ASSESSMENT & PLAN NOTE
Discussed with son, he does not believe the patient is currently on methotrexate or Plaquenil  States he will bring records in from nursing home today  -hold Plaquenil and methotrexate at this time

## 2022-06-27 NOTE — ED NOTES
Pt provided with 4ozs orange juice, applesauce, and strawberry jello  Eating/drinking and tolerating same        Shankar Hawkins, MAURO  06/27/22 9479

## 2022-06-27 NOTE — ASSESSMENT & PLAN NOTE
Lab Results   Component Value Date    EGFR 22 06/27/2022    EGFR 30 10/03/2018    CREATININE 1 98 (H) 06/27/2022    CREATININE 1 57 (H) 10/03/2018     Creatinine 1 98 on admission, baseline around 1 4-1 5  -hold home Lasix and losartan  -gently hydrate  -continue to monitor

## 2022-06-27 NOTE — ASSESSMENT & PLAN NOTE
UA suggestive of UTI  Patient denies symptoms at this time however is poor historian  -will start Rocephin 1 g IV Q 24 hours  -continue to monitor

## 2022-06-27 NOTE — ASSESSMENT & PLAN NOTE
-hold home losartan  -continue home metoprolol  -hydralazine 10 mg IV q 4 hours p r n   For systolic blood pressure greater than 170

## 2022-06-28 VITALS
SYSTOLIC BLOOD PRESSURE: 171 MMHG | HEART RATE: 67 BPM | WEIGHT: 158.73 LBS | BODY MASS INDEX: 25.51 KG/M2 | OXYGEN SATURATION: 97 % | DIASTOLIC BLOOD PRESSURE: 75 MMHG | RESPIRATION RATE: 18 BRPM | HEIGHT: 66 IN | TEMPERATURE: 97.5 F

## 2022-06-28 LAB
ALBUMIN SERPL BCP-MCNC: 3.1 G/DL (ref 3.5–5)
ALP SERPL-CCNC: 72 U/L (ref 34–104)
ALT SERPL W P-5'-P-CCNC: 13 U/L (ref 7–52)
ANION GAP SERPL CALCULATED.3IONS-SCNC: 6 MMOL/L (ref 4–13)
AST SERPL W P-5'-P-CCNC: 22 U/L (ref 13–39)
BILIRUB SERPL-MCNC: 0.33 MG/DL (ref 0.2–1)
BUN SERPL-MCNC: 37 MG/DL (ref 5–25)
CALCIUM ALBUM COR SERPL-MCNC: 9.3 MG/DL (ref 8.3–10.1)
CALCIUM SERPL-MCNC: 8.6 MG/DL (ref 8.4–10.2)
CHLORIDE SERPL-SCNC: 106 MMOL/L (ref 96–108)
CO2 SERPL-SCNC: 25 MMOL/L (ref 21–32)
CREAT SERPL-MCNC: 1.69 MG/DL (ref 0.6–1.3)
ERYTHROCYTE [DISTWIDTH] IN BLOOD BY AUTOMATED COUNT: 16.1 % (ref 11.6–15.1)
GFR SERPL CREATININE-BSD FRML MDRD: 26 ML/MIN/1.73SQ M
GLUCOSE P FAST SERPL-MCNC: 143 MG/DL (ref 65–99)
GLUCOSE SERPL-MCNC: 143 MG/DL (ref 65–140)
GLUCOSE SERPL-MCNC: 180 MG/DL (ref 65–140)
GLUCOSE SERPL-MCNC: 316 MG/DL (ref 65–140)
HCT VFR BLD AUTO: 30.5 % (ref 34.8–46.1)
HGB BLD-MCNC: 9.1 G/DL (ref 11.5–15.4)
MCH RBC QN AUTO: 28.3 PG (ref 26.8–34.3)
MCHC RBC AUTO-ENTMCNC: 29.8 G/DL (ref 31.4–37.4)
MCV RBC AUTO: 95 FL (ref 82–98)
PLATELET # BLD AUTO: 275 THOUSANDS/UL (ref 149–390)
PMV BLD AUTO: 9.6 FL (ref 8.9–12.7)
POTASSIUM SERPL-SCNC: 4.8 MMOL/L (ref 3.5–5.3)
PROT SERPL-MCNC: 6.2 G/DL (ref 6.4–8.4)
RBC # BLD AUTO: 3.22 MILLION/UL (ref 3.81–5.12)
SODIUM SERPL-SCNC: 137 MMOL/L (ref 135–147)
WBC # BLD AUTO: 7.28 THOUSAND/UL (ref 4.31–10.16)

## 2022-06-28 PROCEDURE — 85027 COMPLETE CBC AUTOMATED: CPT | Performed by: STUDENT IN AN ORGANIZED HEALTH CARE EDUCATION/TRAINING PROGRAM

## 2022-06-28 PROCEDURE — 36415 COLL VENOUS BLD VENIPUNCTURE: CPT | Performed by: STUDENT IN AN ORGANIZED HEALTH CARE EDUCATION/TRAINING PROGRAM

## 2022-06-28 PROCEDURE — 82948 REAGENT STRIP/BLOOD GLUCOSE: CPT

## 2022-06-28 PROCEDURE — 97162 PT EVAL MOD COMPLEX 30 MIN: CPT

## 2022-06-28 PROCEDURE — 99217 PR OBSERVATION CARE DISCHARGE MANAGEMENT: CPT | Performed by: NURSE PRACTITIONER

## 2022-06-28 PROCEDURE — 80053 COMPREHEN METABOLIC PANEL: CPT | Performed by: STUDENT IN AN ORGANIZED HEALTH CARE EDUCATION/TRAINING PROGRAM

## 2022-06-28 PROCEDURE — 97167 OT EVAL HIGH COMPLEX 60 MIN: CPT

## 2022-06-28 RX ORDER — CEPHALEXIN 500 MG/1
500 CAPSULE ORAL EVERY 8 HOURS SCHEDULED
Status: DISCONTINUED | OUTPATIENT
Start: 2022-06-29 | End: 2022-06-28 | Stop reason: HOSPADM

## 2022-06-28 RX ORDER — DOXYCYCLINE HYCLATE 50 MG/1
324 CAPSULE, GELATIN COATED ORAL
Status: DISCONTINUED | OUTPATIENT
Start: 2022-06-29 | End: 2022-06-28 | Stop reason: HOSPADM

## 2022-06-28 RX ORDER — DOXYCYCLINE HYCLATE 50 MG/1
324 CAPSULE, GELATIN COATED ORAL
Status: DISCONTINUED | OUTPATIENT
Start: 2022-06-28 | End: 2022-06-28

## 2022-06-28 RX ORDER — DOCUSATE SODIUM 100 MG/1
100 CAPSULE, LIQUID FILLED ORAL 2 TIMES DAILY
Qty: 60 CAPSULE | Refills: 0 | Status: SHIPPED | OUTPATIENT
Start: 2022-06-28 | End: 2022-07-28

## 2022-06-28 RX ORDER — DOCUSATE SODIUM 100 MG/1
100 CAPSULE, LIQUID FILLED ORAL 2 TIMES DAILY
Status: DISCONTINUED | OUTPATIENT
Start: 2022-06-28 | End: 2022-06-28 | Stop reason: HOSPADM

## 2022-06-28 RX ORDER — INSULIN LISPRO 100 [IU]/ML
5 INJECTION, SOLUTION INTRAVENOUS; SUBCUTANEOUS
Qty: 5 ML | Refills: 0 | Status: SHIPPED | OUTPATIENT
Start: 2022-06-28

## 2022-06-28 RX ORDER — BLOOD-GLUCOSE METER
1 KIT MISCELLANEOUS
Qty: 100 STRIP | Refills: 0 | Status: SHIPPED | OUTPATIENT
Start: 2022-06-28

## 2022-06-28 RX ORDER — DOXYCYCLINE HYCLATE 50 MG/1
324 CAPSULE, GELATIN COATED ORAL
Qty: 60 TABLET | Refills: 0 | Status: SHIPPED | OUTPATIENT
Start: 2022-06-29

## 2022-06-28 RX ORDER — ACETAMINOPHEN 325 MG/1
650 TABLET ORAL 4 TIMES DAILY PRN
Status: DISCONTINUED | OUTPATIENT
Start: 2022-06-28 | End: 2022-06-28 | Stop reason: HOSPADM

## 2022-06-28 RX ORDER — CEPHALEXIN 500 MG/1
500 CAPSULE ORAL EVERY 8 HOURS SCHEDULED
Qty: 3 CAPSULE | Refills: 0 | Status: SHIPPED | OUTPATIENT
Start: 2022-06-29 | End: 2022-06-30

## 2022-06-28 RX ADMIN — CEFTRIAXONE 1000 MG: 1 INJECTION, SOLUTION INTRAVENOUS at 11:05

## 2022-06-28 RX ADMIN — LEVOTHYROXINE SODIUM 50 MCG: 25 TABLET ORAL at 09:10

## 2022-06-28 RX ADMIN — FOLIC ACID 1 MG: 1 TABLET ORAL at 09:10

## 2022-06-28 RX ADMIN — HEPARIN SODIUM 5000 UNITS: 5000 INJECTION INTRAVENOUS; SUBCUTANEOUS at 09:11

## 2022-06-28 RX ADMIN — METOPROLOL TARTRATE 25 MG: 25 TABLET, FILM COATED ORAL at 09:10

## 2022-06-28 RX ADMIN — INSULIN LISPRO 3 UNITS: 100 INJECTION, SOLUTION INTRAVENOUS; SUBCUTANEOUS at 11:54

## 2022-06-28 RX ADMIN — GABAPENTIN 100 MG: 100 CAPSULE ORAL at 09:10

## 2022-06-28 RX ADMIN — POTASSIUM CHLORIDE 10 MEQ: 20 SOLUTION ORAL at 09:10

## 2022-06-28 RX ADMIN — IRON SUCROSE 300 MG: 20 INJECTION, SOLUTION INTRAVENOUS at 11:55

## 2022-06-28 RX ADMIN — DOCUSATE SODIUM 100 MG: 100 CAPSULE, LIQUID FILLED ORAL at 11:04

## 2022-06-28 NOTE — ASSESSMENT & PLAN NOTE
UA suggestive of UTI  Patient denies symptoms at this time however is poor historian  Received 2 doses of ceftriaxone  Will continue with keflex for 1 more day to complete 3 days course

## 2022-06-28 NOTE — OCCUPATIONAL THERAPY NOTE
Occupational Therapy Evaluation      Gifty Celis    [birth year]    Principal Problem:    Hypoglycemia due to type 2 diabetes mellitus ([facility] )  Active Problems:    Dementia ([facility] )    Anemia    Rheumatoid arthritis ([facility] )    UTI (urinary tract infection)    Acute kidney injury superimposed on chronic kidney disease ([facility] )    Hypertension    Sacral wound      Past Medical History:   Diagnosis Date    Dementia ([facility] )     Diabetes mellitus ([facility] )     Disease of thyroid gland     Hypertension     Rheumatoid arthritis ([facility] )        History reviewed  No pertinent surgical history  06/28/22 0846   OT Last Visit   OT Visit Date 06/28/22   Note Type   Note type Evaluation   Restrictions/Precautions   Weight Bearing Precautions Per Order No   Other Precautions Hard of hearing;Telemetry; Fall Risk;Cognitive   Pain Assessment   Pain Assessment Tool 0-10   Pain Score No Pain   Home Living   Type of Home Assisted living  [facility])   Home Layout One level;Performs ADLs on one level; Able to live on main level with bedroom/bathroom;Stairs to enter with rails  (BOBBY 4-5, Pt reports BOBBY)   Bathroom Shower/Tub Walk-in shower   Bathroom Toilet Raised   Bathroom Equipment Shower chair;Grab bars in shower   [address] Other (Comment)  ([place])   Additional Comments Questionable accuracy of home s/u and PLOF due to Pt cognition, per chart review Pt lives in [facility]   Prior Function   Level of [place] Independent with ADLs and functional mobility  (per Pt)   Lives With Alone   Receives Help From Family;Personal care attendant  (Grandchildren visit every week to help with cleaning)   ADL Assistance Independent   IADLs Needs assistance   Falls in the last 6 months 1 to 4  (2 falls "stiff knee")   Vocational Retired   Comments -, Son helps with driving   ADL   Eating Assistance 5  Supervision/Setup   Eating Deficit Setup   Grooming Assistance 5  Supervision/Setup   UB [address] 5  Supervision/Setup LB Bathing Assistance 3  Moderate Assistance   UB Dressing Assistance 4  Minimal [address] 3  Moderate [address]  4  Minimal Assistance   Bed Mobility   Additional Comments Pt OOB on arrival and conclusion   Transfers   Sit to Stand 4  Minimal assistance   Additional items Assist x 1;Verbal cues; Increased time required;Armrests   Stand to Sit   (CGA)   Additional items Assist x 1; Armrests; Increased time required;Verbal cues   Functional Mobility   Functional Mobility   (CGA)   Additional Comments A x1   Additional items Rolling walker   Balance   Static Sitting Good   Dynamic Sitting Good   Static Standing Fair   Dynamic Standing Fair -   Ambulatory Fair -   Activity Tolerance   Activity Tolerance Patient limited by fatigue; Other (Comment)  ([place] and cognition)   Medical Staff Made Aware CM [facility]   Nurse Made Aware MAURO [place]   RUE Assessment   RUE Assessment X   RUE Overall AROM   R Shoulder Flexion grossly estimated, 120 degrees   RUE Strength   RUE Overall Strength Deficits  (3+/5)   LUE Assessment   LUE Assessment X  (AROM WFL)   LUE Strength   LUE Overall Strength Deficits  (3+/5)   Sensation   Light Touch No apparent deficits  (but Pt reports occ numbness in B feet)   Vision-Basic Assessment   Current Vision Wears glasses all the time   Cognition   Overall Cognitive Status Impaired   Arousal/Participation Alert; Cooperative   Attention Attends with cues to redirect   Orientation Level Oriented to person;Disoriented to time;Disoriented to situation;Disoriented to place   Memory Decreased long term memory   Following Commands Follows one step commands with increased time or repetition   Assessment   Limitation Decreased ADL status; Decreased UE ROM; Decreased UE strength;Decreased Safe judgement during ADL;Decreased cognition;Decreased endurance;Decreased self-care trans;Decreased high-level ADLs   Prognosis Good   Assessment Pt is a 80 y o  female seen for OT evaluation s/p admit to [facility]'s on 6/27/2022 w/ Hypoglycemia due to type 2 diabetes mellitus ([facility] )  Comorbidities affecting pt's functional performance at time of assessment include: Dementia, DM, Disease of thyroid gland, HTN, RA  Personal factors affecting pt at time of IE include:difficulty performing ADLS and limited insight into deficits  Prior to admission, pt required A with ADLs  Upon evaluation: the following deficits impact occupational performance: decreased strength, decreased balance, decreased tolerance, impaired attention, impaired initiation, impaired memory, impaired sequencing, impaired problem solving and decreased safety awareness  Pt to benefit from continued skilled OT tx while in the hospital to address deficits as defined above and maximize level of functional independence w ADL's and functional mobility  Occupational Performance areas to address include: bathing/shower, toilet hygiene, dressing, functional mobility and clothing management  From OT standpoint, recommendation at time of d/c would be return to facility with rehab services  Goals   Patient Goals to eat breakfast   Plan   Treatment Interventions ADL retraining;Functional transfer training;UE strengthening/ROM; Endurance training; Compensatory technique education; Energy conservation; Activityengagement   Goal Expiration Date 07/08/22   OT Treatment Day 0   OT Frequency 3-5x/wk   Recommendation   OT Discharge Recommendation Return to facility with rehabilitation services   OT - OK to Discharge Yes  (when medically stable)   Additional Comments  Pt seen as a co-eval with PT due to the patient's co-morbidities, clinically unstable presentation, and present impairments which are a regression from the patient's baseline  Additional Comments 2 The patient's raw score on the AM-PAC Daily Activity inpatient short form is 16, standardized score is 35 96, less than 39 4   Patients at this level are likely to benefit from discharge to post-acute rehabilitation services, however Pt lives in [facility] with A for ADLs  Please refer to the recommendation of the Occupational Therapist for safe discharge planning     AM-PAC Daily Activity Inpatient   Lower Body Dressing 2   Bathing 2   Toileting 3   Upper Body Dressing 3   Grooming 3   Eating 3   Daily Activity Raw Score 16   Daily Activity Standardized Score (Calc for Raw Score >=11) 35 96   AM-PAC Applied Cognition Inpatient   Following a Speech/Presentation 3   Understanding Ordinary Conversation 3   Taking Medications 1   Remembering Where Things Are Placed or Put Away 1   Remembering List of 4-5 Errands 1   Taking Care of Complicated Tasks 1   Applied Cognition Raw Score 10   Applied Cognition Standardized Score 24 98     GOALS:    Pt will achieve the following within specified time frame: STG  Pt will achieve the following goals within 5 days    *ADL transfers with CGA for inc'd independence with ADLs/purposeful tasks    *Self Feeding- (I) for inc'd independence with providing self nourishment    *UB ADL with (S) for inc'd independence with self cares    *LB ADL with Mod (A) using AE prn for inc'd independence with self cares    *Toileting with Min (A) for clothing management and hygiene for return to [facility] with personal care    *Increase static stand balance to F+ and dyn stand balance to F for inc'd safety with standing purposeful tasks    *Increase stand tolerance x3 m for inc'd tolerance with standing purposeful tasks    *Participate in 10m UE therex to increase overall stamina/activity tolerance for purposeful tasks    *Bed mobility- (S) for inc'd independence to manage own comfort and initiate EOB & OOB purposeful tasks    Pt will achieve the following within specified time frame: LTG  Pt will achieve the following goals within 10 days    *ADL transfers with (S) for inc'd independence with ADLs/purposeful tasks    *UB ADL with (I) for inc'd independence with self cares    *LB ADL with Min (A) using AE prn for inc'd independence with self cares    *Toileting with CGA for clothing management and hygiene for return to PLOF with personal care    *Increase static stand balance to G- and dyn stand balance to F+ for inc'd safety with standing purposeful tasks    *Increase stand tolerance x5 m for inc'd tolerance with standing purposeful tasks    *Bed mobility- (I) for inc'd independence to manage own comfort and initiate EOB & OOB purposeful tasks      Jose J Machado MS, OTR/L

## 2022-06-28 NOTE — ASSESSMENT & PLAN NOTE
· Hemoglobin 8 6 on admission  Review prior records reveals hemoglobin of 9 9 April 2022  · No active signs of bleeding  · Hgb 9 1 today   · Iron panel reviewed- suspects component of iron deficiency  Received 1 dose of Venofer infusion  Will start PO supplement on discharge   · Discussed with her son- Coco Simpson in details regarding her anemia  He does not think that the patient would like to pursue any invasive procedure including colonoscopy or flexible sigmoidoscopy even if her occult blood is positive  As there is no further drop in hemoglobin, the patient's son is okay to repeat H&H outpatient and follow-up with PCP for further treatment options if needed  Will recommend to repeat CBC in 2 weeks

## 2022-06-28 NOTE — ASSESSMENT & PLAN NOTE
Lab Results   Component Value Date    HGBA1C 7 5 (H) 06/27/2022       Recent Labs     06/27/22  1728 06/27/22  1933 06/27/22  2224 06/28/22  0359   POCGLU 251* 275* 280* 180*       Blood Sugar Average: Last 72 hrs:  (P) 829 [id number]     Patient presented with persistent hypoglycemia  Her son reports that there is no recent insulin adjustment however the patient has not been eating "junk food/sweet" as she used to  Her insulin regimen was increased due to her poorly controlled BG  Her A1C now much improved  On discharge, recommend to   Patient received IV dextrose-BG much improved   Will continue continue Levemir but decrease the dose to 20 at HS  Stop actos and NPH  Will add short acting- humalog sliding scale  Discussed with her son  Patient will need to see PCP for further monitoring and adjustment

## 2022-06-28 NOTE — ED NOTES
Stage 2  Quarter size wound in sacral region, blanchable  Assessed at 1930  Patient denies any pain        Gissel Mosley RN  06/27/22 2001

## 2022-06-28 NOTE — ASSESSMENT & PLAN NOTE
Discussed with son, he does not believe the patient is currently on methotrexate or Plaquenil  Return to her home regimen

## 2022-06-28 NOTE — PLAN OF CARE
Problem: OCCUPATIONAL THERAPY ADULT  Goal: Performs self-care activities at highest level of function for planned discharge setting  See evaluation for individualized goals  Description: Treatment Interventions: ADL retraining, Functional transfer training, UE strengthening/ROM, Endurance training, Compensatory technique education, Energy conservation, Activityengagement    See flowsheet documentation for full assessment, interventions and recommendations  Note: Limitation: Decreased ADL status, Decreased UE ROM, Decreased UE strength, Decreased Safe judgement during ADL, Decreased cognition, Decreased endurance, Decreased self-care trans, Decreased high-level ADLs  Prognosis: Good  Assessment: Pt is a 80 y o  female seen for OT evaluation s/p admit to [facility] on 6/27/2022 w/ Hypoglycemia due to type 2 diabetes mellitus ([facility] [address] )  Comorbidities affecting pt's functional performance at time of assessment include: Dementia, DM, Disease of thyroid gland, HTN, RA  Personal factors affecting pt at time of IE include:difficulty performing ADLS and limited insight into deficits  Prior to admission, pt required A with ADLs  Upon evaluation: the following deficits impact occupational performance: decreased strength, decreased balance, decreased tolerance, impaired attention, impaired initiation, impaired memory, impaired sequencing, impaired problem solving and decreased safety awareness  Pt to benefit from continued skilled OT tx while in the hospital to address deficits as defined above and maximize level of functional independence w ADL's and functional mobility  Occupational Performance areas to address include: bathing/shower, toilet hygiene, dressing, functional mobility and clothing management  From OT standpoint, recommendation at time of d/c would be return to facility with rehab services       OT Discharge Recommendation: Return to facility with rehabilitation services  OT - OK to Discharge: Yes (when medically stable)    Jose J Ludwig Oven, MS, OTR/L

## 2022-06-28 NOTE — DISCHARGE SUMMARY
[place] 45  Discharge- Mame Flood [birth year], 80 y o  female MRN: [id number]  Unit/Bed#: ED 21 Encounter: [id number]  Primary Care Provider: Bonner Paget, DO   Date and time admitted to hospital: 6/27/2022  6:50 AM    * Hypoglycemia due to type 2 diabetes mellitus [facility])  Assessment & Plan  Lab Results   Component Value Date    HGBA1C 7 5 (H) 06/27/2022       Recent Labs     06/27/22  1728 06/27/22  1933 06/27/22  2224 06/28/22  0359   POCGLU 251* 275* 280* 180*       Blood Sugar Average: Last 72 hrs:  (P) 090 [id number]     Patient presented with persistent hypoglycemia  Her son reports that there is no recent insulin adjustment however the patient has not been eating "junk food/sweet" as she used to  Her insulin regimen was increased due to her poorly controlled BG  Her A1C now much improved  On discharge, recommend to   Patient received IV dextrose-BG much improved   Will continue continue Levemir but decrease the dose to 20 at HS  Stop actos and NPH  Will add short acting- 5 units with meals   Discussed with her son  Patient will need to see PCP for further monitoring and adjustment       Sacral wound  Assessment & Plan  HHC to assess upon discharge     Hypertension  Assessment & Plan  Resume home regimen     Acute kidney injury superimposed on chronic kidney disease [facility])  Assessment & Plan  Lab Results   Component Value Date    EGFR 26 06/28/2022    EGFR 22 06/27/2022    EGFR 30 10/03/2018    CREATININE 1 69 (H) 06/28/2022    CREATININE 1 98 (H) 06/27/2022    CREATININE 1 57 (H) 10/03/2018     · Creatinine 1 98 on admission, baseline around 1 5-1 7 mg/dL   · Back to baseline   · Resume lasix upon discharge     UTI (urinary tract infection)  Assessment & Plan  UA suggestive of UTI  Patient denies symptoms at this time however is poor historian  Received 2 doses of ceftriaxone  Will continue with keflex for 1 more day to complete 3 days course    Rheumatoid arthritis [facility])  Assessment & Plan  Discussed with son, he does not believe the patient is currently on methotrexate or Plaquenil  Return to her home regimen         Anemia  Assessment & Plan  · Hemoglobin 8 6 on admission  Review prior records reveals hemoglobin of 9 9 April 2022  · No active signs of bleeding  · Hgb 9 1 today   · Iron panel reviewed- suspects component of iron deficiency  Received 1 dose of Venofer infusion  Will start PO supplement on discharge   · Discussed with her son- Jenn Brar in details regarding her anemia  He does not think that the patient would like to pursue any invasive procedure including colonoscopy or flexible sigmoidoscopy even if her occult blood is positive  As there is no further drop in hemoglobin, the patient's son is okay to repeat H&H outpatient and follow-up with PCP for further treatment options if needed  Will recommend to repeat CBC in 2 weeks  [facility])  Assessment & Plan  · Discussed with son, patient appears to be a baseline  Has poor short-term memory  · Supportive care       Discharging Physician / Practitioner: Santiago Dey  PCP: Stevo Ruffin DO  Admission Date:   Admission Orders (From admission, onward)     Ordered        06/27/22 0919  Place in Observation  Once                      Discharge Date: 06/28/22    Medical Problems             Resolved Problems  Date Reviewed: 6/28/2022   None                 Consultations During Hospital Stay:  · None     Procedures Performed:   · None     Significant Findings / Test Results:   · cxr Question mild pulmonary venous congestion       Incidental Findings:   · None      Test Results Pending at Discharge (will require follow up):   · Urine culture      Outpatient Tests Requested:  · Follow up with PCP     Complications:     None    Reason for Admission:  Altered mental status    Hospital Course:     Arthur Sutherland is a 80 y o  female patient who originally presented to the hospital on 6/27/2022 due to altered mental status  The patient found hypoglycemic at the assistant living and subsequently sent to the ED  Reportedly the patient found to have altered mental status on the morning of admission and her blood sugar was reported to be at 27  Upon arrival to the ED her blood glucose was 61  After speaking with her son, her insulin regimen was increased due to poorly controlled diabetes however recently diet controlled is much more strict and suspects that her insulin regimen is much more than her requirement  The patient was treated with IV dextrose  Her blood glucose is much improved  On discharge the patient will be on Levemir 20 units at HS and Humalog 5 units with meals  Actos and NPH will be discontinued  Additionally found that the patient has anemia suspects chronic anemia due to iron deficiency  Discussed with her son- Justa Smith in detail  As there is no further drop in hemoglobin, the son agrees to follow-up as an outpatient as he does not want to pursue any invasive procedure including colonoscopy or flexible sigmoidoscopy  The patient is being treated for UTI  She received 2 doses of ceftriaxone and be transitioned to Keflex on discharge  Please see above list of diagnoses and related plan for additional information  Condition at Discharge: good     Discharge Day Visit / Exam:     Subjective:  Feeling okay  Denies any pain  Vitals: Blood Pressure: 161/71 (06/28/22 0910)  Pulse: 73 (06/28/22 0910)  Temperature: 98 1 °F (36 7 °C) (06/27/22 1539)  Temp Source: Oral (06/27/22 0658)  Respirations: 18 (06/28/22 0910)  Height: 5' 6" (167 6 cm) (06/27/22 3005)  Weight - Scale: 72 kg (158 lb 11 7 oz) (06/27/22 0658)  SpO2: 100 % (06/28/22 0910)  Exam:   Physical Exam  Vitals and nursing note reviewed  Constitutional:       General: She is not in acute distress  Appearance: Normal appearance  Comments: Frail and elderly      HENT:      Head: Normocephalic and atraumatic        Right Ear: External ear normal       Left Ear: External ear normal       Nose: Nose normal  No rhinorrhea  Mouth/Throat:      Mouth: Mucous membranes are moist       Pharynx: Oropharynx is clear  Eyes:      General:         Right eye: No discharge  Left eye: No discharge  Pupils: Pupils are equal, round, and reactive to light  Cardiovascular:      Rate and Rhythm: Normal rate and regular rhythm  Pulses: Normal pulses  Heart sounds: Normal heart sounds  No murmur heard  Pulmonary:      Effort: Pulmonary effort is normal  No respiratory distress  Breath sounds: Normal breath sounds  Abdominal:      General: Bowel sounds are normal  There is no distension  Palpations: Abdomen is soft  There is no mass  Tenderness: There is no abdominal tenderness  Musculoskeletal:         General: No swelling or tenderness  Normal range of motion  Cervical back: Normal range of motion and neck supple  No muscular tenderness  Skin:     General: Skin is warm and dry  Capillary Refill: Capillary refill takes less than 2 seconds  Findings: No erythema or rash  Neurological:      General: No focal deficit present  Mental Status: She is alert  Mental status is at baseline  She is disoriented  Psychiatric:         Mood and Affect: Mood normal          Behavior: Behavior normal          Discussion with Family: son via phone     Discharge instructions/Information to patient and family:   See after visit summary for information provided to patient and family  Provisions for Follow-Up Care:  See after visit summary for information related to follow-up care and any pertinent home health orders  Disposition:     [facility] at [facility] Readmission:    No      Discharge Statement:  I spent 38 minutes discharging the patient  This time was spent on the day of discharge  I had direct contact with the patient on the day of discharge   Greater than 50% of the total time was spent examining patient, answering all patient questions, arranging and discussing plan of care with patient as well as directly providing post-discharge instructions  Additional time then spent on discharge activities  Discharge Medications:  See after visit summary for reconciled discharge medications provided to patient and family        ** Please Note: This note has been constructed using a voice recognition system **

## 2022-06-28 NOTE — ASSESSMENT & PLAN NOTE
· Discussed with son, patient appears to be a baseline    Has poor short-term memory  · Supportive care

## 2022-06-28 NOTE — ASSESSMENT & PLAN NOTE
Lab Results   Component Value Date    HGBA1C 7 5 (H) 06/27/2022       Recent Labs     06/27/22  1728 06/27/22  1933 06/27/22  2224 06/28/22  0359   POCGLU 251* 275* 280* 180*       Blood Sugar Average: Last 72 hrs:  (P) 535 [id number]     Patient presented with persistent hypoglycemia  Her son reports that there is no recent insulin adjustment however the patient has not been eating "junk food/sweet" as she used to  Her insulin regimen was increased due to her poorly controlled BG  Her A1C now much improved  On discharge, recommend to   Patient received IV dextrose-BG much improved   Will continue continue Levemir but decrease the dose to 20 at HS  Stop actos and NPH  Will add short acting- 5 units with meals   Discussed with her son  Patient will need to see PCP for further monitoring and adjustment

## 2022-06-28 NOTE — ASSESSMENT & PLAN NOTE
· Hemoglobin 8 6 on admission  Review prior records reveals hemoglobin of 9 9 April 2022  · No active signs of bleeding  · Hgb 9 1 today   · Iron panel reviewed- suspects component of iron deficiency  Received 1 dose of Venofer infusion  Will start PO supplement on discharge   · Discussed with her son- Laura Urias in details regarding her anemia  He does not think that the patient would like to pursue any invasive procedure including colonoscopy or flexible sigmoidoscopy even if her occult blood is positive  As there is no further drop in hemoglobin, the patient's son is okay to repeat H&H outpatient and follow-up with PCP for further treatment options if needed  Will recommend to repeat CBC in 2 weeks

## 2022-06-28 NOTE — ED NOTES
Patient resting all needs met at this time  Call bell within reach        Rigoberto Troncoso RN  06/27/22 1079

## 2022-06-28 NOTE — ED NOTES
Patient resting, all needs met at this time  Call bell within reach        Daphne Cueva RN  06/28/22 0140

## 2022-06-28 NOTE — ASSESSMENT & PLAN NOTE
Lab Results   Component Value Date    EGFR 26 06/28/2022    EGFR 22 06/27/2022    EGFR 30 10/03/2018    CREATININE 1 69 (H) 06/28/2022    CREATININE 1 98 (H) 06/27/2022    CREATININE 1 57 (H) 10/03/2018     · Creatinine 1 98 on admission, baseline around 1 5-1 7 mg/dL   · Back to baseline   · Resume lasix upon discharge

## 2022-06-28 NOTE — PHYSICAL THERAPY NOTE
Physical Therapy Evaluation     Patient's Name: Hedy Bloom    Admitting Diagnosis  Hypoglycemia [E16 2]    Problem List  Patient Active Problem List   Diagnosis    Dementia ([facility] )    Anemia    Rheumatoid arthritis ([facility] )    Hypoglycemia due to type 2 diabetes mellitus ([facility] )    UTI (urinary tract infection)    [facility]    Hypertension    Sacral wound       Past Medical History  Past Medical History:   Diagnosis Date    Dementia ([facility] )     Diabetes mellitus ([facility] )     Disease of thyroid gland     Hypertension     Rheumatoid arthritis ([facility] )        Past Surgical History  History reviewed  No pertinent surgical history  06/28/22 1812   PT Last Visit   PT Visit Date 06/28/22   Note Type   Note type Evaluation   Additional Comments Coassessment done with OTJsoe J, due to patient complexity; therefore requiring two skilled clinicians  Pain Assessment   Pain Assessment Tool 0-10   Pain Score No Pain   Restrictions/Precautions   Other Precautions Hard of hearing;Telemetry; Fall Risk  (was reading lips)   Home Living   Type of Home Assisted living   Home Layout One level;Performs ADLs on one level; Able to live on main level with bedroom/bathroom;Stairs to enter with rails  (BOBBY 4-5)   Bathroom Shower/Tub Walk-in shower   Bathroom Toilet Raised   Bathroom Equipment Grab bars in shower   [address]   (Rollator)   Prior Function   Level of [place] Independent with ADLs and functional mobility   Lives With Alone; Facility staff   Receives Help From Family;Personal care attendant  (Grandchildren visit every week to help with cleaning)   ADL Assistance Independent   IADLs Needs assistance   Falls in the last 6 months 1 to 4  (2 falls "stiff knee")   Vocational Retired   Comments - drive  (Son helps with driving)   Cognition   Overall Cognitive Status Impaired   Arousal/Participation Responsive   Orientation Level Oriented to person;Disoriented to time;Disoriented to situation;Disoriented to place   Memory Decreased long term memory   Following Commands Follows one step commands with increased time or repetition   Subjective   Subjective pt  "when I am in the hospital I feel confused"   RUE Assessment   RUE Assessment X   RUE Overall AROM   R Shoulder Flexion   (grossly estimated, 120 degrees)   RLE Assessment   RLE Assessment X  (3-/5 grossly assessed during transfers and ambulation )   LLE Assessment   LLE Assessment X  (3-/5 grossly assessed during transfers and ambulation )   Vision-Basic Assessment   Current Vision Wears glasses all the time   Coordination   Movements are Fluid and Coordinated 0   Coordination and Movement Description Pts  movements were bradykinetic   Light Touch   RLE Light Touch Grossly intact   LLE Light Touch Grossly intact   Sharp/Dull   RLE Sharp/Dull Impaired   LLE Sharp/Dull Impaired   Proprioception   RLE Proprioception Impaired   LLE Proprioception Impaired   Bed Mobility   Additional Comments DNT  Pt  seated on commode at beginning of therapy  Transfers   Sit to Stand 4  Minimal assistance   Additional items Assist x 1;Verbal cues; Increased time required;Armrests  (RW)   Stand to Sit   (CGA)   Additional items Assist x 1; Armrests; Increased time required;Verbal cues  (RW)   Additional Comments Verbal cues to use one hand to push off from commode while leaving the other on the walker during sit to stand  Pt  cued during stand to sit to feel chair on back of legs and use hands on armrests to decend  Once sitting patient was able to scoot back further into the chair independently  Ambulation/Elevation   Gait pattern Antalgic; Forward Flexion;Decreased foot clearance; Step through pattern;Excessively slow   Gait Assistance   (CGA)   Additional items Assist x 1;Verbal cues   Assistive Device Rolling walker   Distance 5 feet   Ambulation/Elevation Additional Comments Verbal cues for ambulatory directions and RW usage  Pt  reminded to step into the walker and reassured that the brakes were not on  Pt  advised when to make a turn to get in position for sitting  Balance   Static Sitting Good   Dynamic Sitting Good   Static Standing Fair   Dynamic Standing Fair -   Ambulatory Fair -   Endurance Deficit   Endurance Deficit Yes   Endurance Deficit Description Pt  had decreased balance   Activity Tolerance   Activity Tolerance Treatment limited secondary to medical complications (Comment); Patient limited by fatigue  (Communication of activity due to hearing deficit)   Medical Staff Made Aware ROBEL Granados   Nurse Made Aware MAURO Shelton   Assessment   Prognosis Fair   Problem List Decreased strength;Decreased endurance; Impaired balance;Decreased mobility; Decreased coordination; Impaired judgement;Decreased safety awareness; Impaired hearing; Impaired sensation;Decreased skin integrity   Assessment Pt  admitted to hospital on 6/27/22 for hypoglycemia due to type 2 DM  Pt  evaluated by PT and OT, Jose J, to assess functional mobility  Pt  was seated on the commode with MAURO Ventura at the beginning of the session for a gown change  During the gown change patient demonstrated Good static and dynamic sitting  Pt  was oriented to person and disoriented to time, situation, and place  Pain scale on a 0-10 was a 0  During transfers pt  required min assist x1  After performing sit to stand patient stood for five minutes, demonstrating Fair static standing balance  When asked to help with pericare pt  required assistance to wipe in the back and pull up pants, this demonstrated Fair - dynamic posture  Pt  ambulated 5 feet with RW CGA  Gait pattern during ambulation was antalgic, forward flexed, decreased foot clearance, slow, and used a step through pattern  During ambulation pt  reported that the walker was a lot harder to move compared to her rollator    At the end of the session pt  seated in recliner eating breakfast  Pt  will benefit from skilled physical therapy to help increase ambulation distance and endurance, increase balance, increase B LE strength, and improve transfers  Goals   Patient Goals to eat breakfast   LTG Expiration Date 07/08/22   Long Term Goal #1 1) Pt  Will increase gross B LE strength by a half a grade to be able to improve ambulation endurance and decrease fall risk  2) Pt  Will increase ambulation to 75 ft RW CGA to be able walk within home environment  3) Pt  Will improve dynamic standing balance to Good to be able to reach out of ROMAN and perform pericare  4) Pt  Will improve sit to stand transfer to Mod I to be able to decrease burden of care, and transfer from toilet and chairs  5) Pt  Will improve standing ambulatory posture to improve balance, decrease fall risk, and improve shoulder ROM  Plan   Treatment/Interventions Functional transfer training;LE strengthening/ROM; Therapeutic exercise; Endurance training;Cognitive reorientation;Gait training;Spoke to nursing;Spoke to case management;OT   PT Frequency 3-5x/wk   Recommendation   PT Discharge Recommendation (S)  Return to facility with rehabilitation services   Equipment Recommended   (Rollator)   Sksanty 8 in Bed Without Bedrails 3   Lying on Back to Sitting on Edge of Flat Bed 3   Moving Bed to Chair 3   Standing Up From Chair 3   Walk in Room 3   Climb 3-5 Stairs 2   Basic Mobility Inpatient Raw Score 17   Basic Mobility Standardized Score 39 67   Highest Level Of Mobility   -HLM Goal 5: Stand one or more mins   -HLM Achieved 5: Stand (1 or more minutes)   End of Consult   Patient Position at End of Consult   (Pt  seated in recliner eating breakfast )     History: dementia, RA, DM type II, HTN  Body System Impairments: hypoglycemia due to DM type II, difficulty hearing, decreased recall of recent events, sensation changes in hands and feet, general weakness      Clinical Presentation: increased risk of integumentary changes, decreased balance, increased fall risk, decreased activity tolerance  Based off this the pts  Clinical presentation is of a moderate complexity         Mai Casper, SPT

## 2022-06-28 NOTE — PLAN OF CARE
Problem: PHYSICAL THERAPY ADULT  Goal: Performs mobility at highest level of function for planned discharge setting  See evaluation for individualized goals  Description: Treatment/Interventions: Functional transfer training, LE strengthening/ROM, Therapeutic exercise, Endurance training, Cognitive reorientation, Gait training, Spoke to nursing, Spoke to case management, OT  Equipment Recommended:  (Rollator)       See flowsheet documentation for full assessment, interventions and recommendations  Note: Prognosis: Fair  Problem List: Decreased strength, Decreased endurance, Impaired balance, Decreased mobility, Decreased coordination, Impaired judgement, Decreased safety awareness, Impaired hearing, Impaired sensation, Decreased skin integrity  Assessment: Pt  admitted to hospital on 6/27/22 for hypoglycemia due to type 2 DM  Pt  evaluated by PT and OT, Jose J, to assess functional mobility  Pt  was seated on the commode with RN Catrina Eric at the beginning of the session for a gown change  During the gown change patient demonstrated Good static and dynamic sitting  Pt  was oriented to person and disoriented to time, situation, and place  Pain scale on a 0-10 was a 0  During transfers pt  required min assist x1  After performing sit to stand patient stood for five minutes, demonstrating Fair static standing balance  When asked to help with pericare pt  required assistance to wipe in the back and pull up pants, this demonstrated Fair - dynamic posture  Pt  ambulated 5 feet with RW CGA  Gait pattern during ambulation was antalgic, forward flexed, decreased foot clearance, slow, and used a step through pattern  During ambulation pt  reported that the walker was a lot harder to move compared to her rollator    At the end of the session pt  seated in recliner eating breakfast  Pt  will benefit from skilled physical therapy to help increase ambulation distance and endurance, increase balance, increase B LE strength, and improve transfers  PT Discharge Recommendation: (S) Return to facility with rehabilitation services          See flowsheet documentation for full assessment

## 2022-06-29 LAB — BACTERIA UR CULT: ABNORMAL

## 2022-06-29 PROCEDURE — U0005 INFEC AGEN DETEC AMPLI PROBE: HCPCS | Performed by: INTERNAL MEDICINE

## 2022-06-29 PROCEDURE — U0003 INFECTIOUS AGENT DETECTION BY NUCLEIC ACID (DNA OR RNA); SEVERE ACUTE RESPIRATORY SYNDROME CORONAVIRUS 2 (SARS-COV-2) (CORONAVIRUS DISEASE [COVID-19]), AMPLIFIED PROBE TECHNIQUE, MAKING USE OF HIGH THROUGHPUT TECHNOLOGIES AS DESCRIBED BY CMS-2020-01-R: HCPCS | Performed by: INTERNAL MEDICINE

## 2022-06-30 ENCOUNTER — LAB REQUISITION (OUTPATIENT)
Dept: LAB | Facility: HOSPITAL | Age: 87
End: 2022-06-30
Payer: MEDICARE

## 2022-06-30 DIAGNOSIS — Z11.59 ENCOUNTER FOR SCREENING FOR OTHER VIRAL DISEASES: ICD-10-CM

## 2022-06-30 DIAGNOSIS — Z03.818 ENCOUNTER FOR OBSERVATION FOR SUSPECTED EXPOSURE TO OTHER BIOLOGICAL AGENTS RULED OUT: ICD-10-CM

## 2022-06-30 LAB — SARS-COV-2 RNA RESP QL NAA+PROBE: NEGATIVE

## 2022-07-07 ENCOUNTER — HOSPITAL ENCOUNTER (EMERGENCY)
Facility: HOSPITAL | Age: 87
Discharge: HOME | End: 2022-07-07
Attending: EMERGENCY MEDICINE | Admitting: EMERGENCY MEDICINE
Payer: MEDICARE

## 2022-07-07 ENCOUNTER — APPOINTMENT (EMERGENCY)
Dept: CT IMAGING | Facility: HOSPITAL | Age: 87
End: 2022-07-07
Payer: MEDICARE

## 2022-07-07 VITALS
SYSTOLIC BLOOD PRESSURE: 186 MMHG | HEART RATE: 82 BPM | RESPIRATION RATE: 18 BRPM | DIASTOLIC BLOOD PRESSURE: 67 MMHG | TEMPERATURE: 98.7 F | OXYGEN SATURATION: 100 %
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
## 2022-07-07 LAB
ANION GAP SERPL CALCULATED.3IONS-SCNC: 7 MMOL/L (ref 4–13)
BASOPHILS # BLD AUTO: 0.02 THOUSANDS/ΜL (ref 0–0.1)
BASOPHILS NFR BLD AUTO: 0 % (ref 0–1)
BUN SERPL-MCNC: 32 MG/DL (ref 5–25)
CALCIUM SERPL-MCNC: 8.5 MG/DL (ref 8.4–10.2)
CHLORIDE SERPL-SCNC: 99 MMOL/L (ref 96–108)
CK SERPL-CCNC: 94 U/L (ref 26–192)
CO2 SERPL-SCNC: 26 MMOL/L (ref 21–32)
CREAT SERPL-MCNC: 1.72 MG/DL (ref 0.6–1.3)
EOSINOPHIL # BLD AUTO: 0 THOUSAND/ΜL (ref 0–0.61)
EOSINOPHIL NFR BLD AUTO: 0 % (ref 0–6)
ERYTHROCYTE [DISTWIDTH] IN BLOOD BY AUTOMATED COUNT: 15.9 % (ref 11.6–15.1)
GFR SERPL CREATININE-BSD FRML MDRD: 26 ML/MIN/1.73SQ M
GLUCOSE SERPL-MCNC: 225 MG/DL (ref 65–140)
HCT VFR BLD AUTO: 30.5 % (ref 34.8–46.1)
HGB BLD-MCNC: 9.3 G/DL (ref 11.5–15.4)
IMM GRANULOCYTES # BLD AUTO: 0.09 THOUSAND/UL (ref 0–0.2)
IMM GRANULOCYTES NFR BLD AUTO: 1 % (ref 0–2)
LYMPHOCYTES # BLD AUTO: 1.32 THOUSANDS/ΜL (ref 0.6–4.47)
LYMPHOCYTES NFR BLD AUTO: 17 % (ref 14–44)
MCH RBC QN AUTO: 28.4 PG (ref 26.8–34.3)
MCHC RBC AUTO-ENTMCNC: 30.5 G/DL (ref 31.4–37.4)
MCV RBC AUTO: 93 FL (ref 82–98)
MONOCYTES # BLD AUTO: 0.75 THOUSAND/ΜL (ref 0.17–1.22)
MONOCYTES NFR BLD AUTO: 10 % (ref 4–12)
NEUTROPHILS # BLD AUTO: 5.43 THOUSANDS/ΜL (ref 1.85–7.62)
NEUTS SEG NFR BLD AUTO: 72 % (ref 43–75)
NRBC BLD AUTO-RTO: 0 /100 WBCS
PLATELET # BLD AUTO: 288 THOUSANDS/UL (ref 149–390)
PMV BLD AUTO: 9.6 FL (ref 8.9–12.7)
POTASSIUM SERPL-SCNC: 4.7 MMOL/L (ref 3.5–5.3)
RBC # BLD AUTO: 3.27 MILLION/UL (ref 3.81–5.12)
SODIUM SERPL-SCNC: 132 MMOL/L (ref 135–147)
WBC # BLD AUTO: 7.61 THOUSAND/UL (ref 4.31–10.16)

## 2022-07-07 PROCEDURE — G1004 CDSM NDSC: HCPCS

## 2022-07-07 PROCEDURE — 72125 CT NECK SPINE W/O DYE: CPT

## 2022-07-07 PROCEDURE — 36415 COLL VENOUS BLD VENIPUNCTURE: CPT | Performed by: EMERGENCY MEDICINE

## 2022-07-07 PROCEDURE — 70450 CT HEAD/BRAIN W/O DYE: CPT

## 2022-07-07 PROCEDURE — 80048 BASIC METABOLIC PNL TOTAL CA: CPT | Performed by: EMERGENCY MEDICINE

## 2022-07-07 PROCEDURE — 99285 EMERGENCY DEPT VISIT HI MDM: CPT

## 2022-07-07 PROCEDURE — 85025 COMPLETE CBC W/AUTO DIFF WBC: CPT | Performed by: EMERGENCY MEDICINE

## 2022-07-07 PROCEDURE — 82550 ASSAY OF CK (CPK): CPT | Performed by: EMERGENCY MEDICINE

## 2022-07-07 PROCEDURE — 99282 EMERGENCY DEPT VISIT SF MDM: CPT | Performed by: EMERGENCY MEDICINE

## 2022-07-08 NOTE — ED NOTES
Pt cleaned to of incontinence with warm wipes and barrier cream applied to buttocks  Pt placed in clean brief and clean luly pad under her  Pt tucked in with warm blanket  Pt has no needs at this time  Pt updated that her ride back to [facility] should be here within the hour       Jennie Bansal, [place]  07/08/22 1909

## 2022-07-08 NOTE — ED NOTES
[facility] called to set up transportation back to [facility], will call back with more information when available       Evelia Ren RN  07/07/22 2056

## 2022-07-08 NOTE — ED NOTES
[facility] called and asked about transportation options to return pt to their facility, unable to help so this RN will call [facility]  Report given to Nia Maciel at [facility], will call back with return transportation information when available        Cinthya Gray RN  07/07/22 1067

## 2022-07-08 NOTE — ED NOTES
Pt cleaned of incontinence, this RN and MAURO HOOPER discovered an approximately quarter sized open wound noted to R upper medial buttock  Barrier cream placed and clean brief in place  Pt covered with warm blanket       Silvia Mckenna RN  07/07/22 2037

## 2022-07-08 NOTE — ED NOTES
JARAD called for information regarding transportation back to nursing home, no available transport units tonight but will try to schedule trip in AM      Oneyad Reyez RN  07/07/22 0516

## 2022-07-08 NOTE — ED PROVIDER NOTES
History  Chief Complaint   Patient presents with    Fall     UNWITNESSED    Denies any pain     Patient is an 71-year-old female with history of dementia that presents from nursing home for evaluation of fall  Patient was found on the ground, patient says that she tripped and fell  The fall was unwitnessed  Patient was on the ground her possibly 20-30 minutes nursing home tells me  Patient has absolutely no complaints at this time  She denies headache, nausea vomiting, focal neurologic deficit  She denies neck pain, chest pain dyspnea abdominal pain  Patient initially was complaining of some left hip and left elbow pain that has since resolved spontaneously  Patient has not taken anything for symptoms  Prior to Admission Medications   Prescriptions Last Dose Informant Patient Reported? Taking? Bioflavonoid Products (WHITNEY C PO)   Yes Yes   Sig: Take 1,000 mg by mouth in the morning   Insulin Lispro (HumaLOG) 100 units/mL cartridge for injection   No No   Sig: Inject 5 Units under the skin 3 (three) times a day with meals   Insulin Pen Needle 30G X 8 MM MISC   Yes No   Sig: TO BE USED WITH LEVEMIR INSULIN AT BEDTIME   Loperamide-Simethicone (IMODIUM MULTI-SYMPTOM RELIEF PO)   Yes No   Sig: Take 1 capsule by mouth Not to exceed 8 tablets/24 hours   acetaminophen (TYLENOL) 500 mg tablet   Yes No   Sig: Take 500 mg by mouth every 6 (six) hours as needed for mild pain   docusate sodium (COLACE) 100 mg capsule   No Yes   Sig: Take 1 capsule (100 mg total) by mouth 2 (two) times a day   ferrous gluconate (FERGON) 324 mg tablet   No Yes   Sig: Take 1 tablet (324 mg total) by mouth 2 (two) times a day before meals   folic acid (FOLVITE) 1 mg tablet   Yes Yes   Sig: Take by mouth daily   furosemide (LASIX) 20 mg tablet   Yes Yes   Sig: Take 20 mg by mouth daily   gabapentin (NEURONTIN) 100 mg capsule   Yes No   Sig: Take 100 mg by mouth in the morning and 100 mg in the evening and 100 mg before bedtime  glucose blood (Glucocard Vital Test) test strip   No No   Sig: Use 1 each 4 (four) times a day (before meals and at bedtime) Use as instructed   hydroxychloroquine (PLAQUENIL) 200 mg tablet   Yes No   Sig: Take 200 mg by mouth 2 (two) times a day with meals   insulin detemir (LEVEMIR) 100 units/mL subcutaneous injection   No No   Sig: Inject 20 Units under the skin daily at bedtime   levothyroxine 50 mcg tablet   Yes No   Sig: Take 50 mcg by mouth in the morning  losartan (COZAAR) 50 mg tablet   Yes Yes   Sig: Take 50 mg by mouth in the morning  methotrexate 2 5 mg tablet   Yes Yes   Sig: Take 2 5 mg by mouth once a week Sundays   metoprolol tartrate (LOPRESSOR) 25 mg tablet   Yes Yes   Sig: Take 25 mg by mouth every 12 (twelve) hours   potassium chloride (Klor-Con) 10 mEq tablet   Yes Yes   Sig: Take 10 mEq by mouth in the morning      Facility-Administered Medications: None       Past Medical History:   Diagnosis Date    Dementia ([place] [address] )     Diabetes mellitus ([place] [address] )     Disease of thyroid gland     Hypertension     Rheumatoid arthritis ([place] [address] )        History reviewed  No pertinent surgical history  History reviewed  No pertinent family history  I have reviewed and agree with the history as documented  E-Cigarette/Vaping    E-Cigarette Use Never User      E-Cigarette/Vaping Substances    Nicotine No     THC No     CBD No     Flavoring No     Other No     Unknown No      Social History     Tobacco Use    Smoking status: Never Smoker    Smokeless tobacco: Never Used   Vaping Use    Vaping Use: Never used   Substance Use Topics    Alcohol use: Not Currently    Drug use: Never       Review of Systems   Constitutional: Negative for fever  HENT: Negative for sore throat  Respiratory: Negative for shortness of breath  Cardiovascular: Negative for chest pain  Gastrointestinal: Negative for abdominal pain  Genitourinary: Negative for dysuria  Musculoskeletal: Negative for back pain  Skin: Negative for rash  Neurological: Negative for light-headedness  Psychiatric/Behavioral: Negative for agitation  All other systems reviewed and are negative  Physical Exam  Physical Exam  Vitals reviewed  Constitutional:       General: She is not in acute distress  Appearance: She is well-developed  HENT:      Head: Normocephalic  Eyes:      Pupils: Pupils are equal, round, and reactive to light  Cardiovascular:      Rate and Rhythm: Normal rate and regular rhythm  Heart sounds: Normal heart sounds  Pulmonary:      Effort: Pulmonary effort is normal       Breath sounds: Normal breath sounds  Abdominal:      General: Bowel sounds are normal  There is no distension  Palpations: Abdomen is soft  Tenderness: There is no abdominal tenderness  There is no guarding  Musculoskeletal:         General: No tenderness or deformity  Normal range of motion  Cervical back: Normal range of motion and neck supple  Comments: Patient full range of motion in all 4 extremities  No tenderness palpation throughout   Skin:     General: Skin is warm and dry  Capillary Refill: Capillary refill takes less than 2 seconds  Neurological:      Mental Status: She is alert  Cranial Nerves: No cranial nerve deficit  Sensory: No sensory deficit  Comments: Alert oriented x2, GCS 14  Baseline  Strength 5/5 in all 4 extremities, sensation intact throughout   Psychiatric:         Behavior: Behavior normal          Thought Content:  Thought content normal          Judgment: Judgment normal          Vital Signs  ED Triage Vitals   Temperature Pulse Respirations Blood Pressure SpO2   07/07/22 2102 07/07/22 1909 07/07/22 1909 07/07/22 1909 07/07/22 1909   98 7 °F (37 1 °C) 82 18 (!) 186/67 100 %      Temp src Heart Rate Source Patient Position - Orthostatic VS BP Location FiO2 (%)   -- -- -- -- --             Pain Score       07/07/22 1909       No Pain           Vitals: 07/07/22 1909   BP: (!) 186/67   Pulse: 82         Visual Acuity      ED Medications  Medications - No data to display    Diagnostic Studies  Results Reviewed     Procedure Component Value Units Date/Time    Basic metabolic panel [[id number]]  (Abnormal) Collected: 07/07/22 1929    Lab Status: Final result Specimen: Blood from Arm, Left Updated: 07/07/22 1955     Sodium 132 mmol/L      Potassium 4 7 mmol/L      Chloride 99 mmol/L      CO2 26 mmol/L      ANION GAP 7 mmol/L      BUN 32 mg/dL      Creatinine 1 72 mg/dL      Glucose 225 mg/dL      Calcium 8 5 mg/dL      eGFR 26 ml/min/1 73sq m     Narrative:      [place] guidelines for Chronic Kidney Disease (CKD):     Stage 1 with normal or high GFR (GFR > 90 mL/min/1 73 square meters)    Stage 2 Mild CKD (GFR = 60-89 mL/min/1 73 square meters)    Stage 3A Moderate CKD (GFR = 45-59 mL/min/1 73 square meters)    Stage 3B Moderate CKD (GFR = 30-44 mL/min/1 73 square meters)    Stage 4 Severe CKD (GFR = 15-29 mL/min/1 73 square meters)    Stage 5 End Stage CKD (GFR <15 mL/min/1 73 square meters)  Note: GFR calculation is accurate only with a steady state creatinine    CK (with reflex to MB) [[id number]]  (Normal) Collected: 07/07/22 1929    Lab Status: Final result Specimen: Blood from Arm, Left Updated: 07/07/22 1955     Total CK 94 U/L     CBC and differential [[id number]]  (Abnormal) Collected: 07/07/22 1929    Lab Status: Final result Specimen: Blood from Arm, Left Updated: 07/07/22 1934     WBC 7 61 Thousand/uL      RBC 3 27 Million/uL      Hemoglobin 9 3 g/dL      Hematocrit 30 5 %      MCV 93 fL      MCH 28 4 pg      MCHC 30 5 g/dL      RDW 15 9 %      MPV 9 6 fL      Platelets 386 Thousands/uL      nRBC 0 /100 WBCs      Neutrophils Relative 72 %      Immat GRANS % 1 %      Lymphocytes Relative 17 %      Monocytes Relative 10 %      Eosinophils Relative 0 %      Basophils Relative 0 %      Neutrophils Absolute 5 43 Thousands/µL Immature Grans Absolute 0 09 Thousand/uL      Lymphocytes Absolute 1 32 Thousands/µL      Monocytes Absolute 0 75 Thousand/µL      Eosinophils Absolute 0 00 Thousand/µL      Basophils Absolute 0 02 Thousands/µL                  CT head without contrast   Final Result by Deven Romero MD (07/07 1958)      No acute intracranial hemorrhage or depressed calvarial fracture identified  No interval change  Workstation performed: [id number]         CT spine cervical without contrast   Final Result by Deven Romero MD (07/07 1957)      No interval change  No acute fracture or evidence for traumatic malalignment  Workstation performed: [id number]                    Procedures  Procedures         ED Course  ED Course as of 07/07/22 2106   Thu Jul 07, 2022   1935 Hemoglobin(!): 9 3  Baseline   1956 Creatinine(!): 1 72  Baseline     2003 Patient reassessed, remains asymptomatic                                             MDM  Number of Diagnoses or Management Options  Fall, initial encounter  Diagnosis management comments: Patient is an 26-year-old female who presents from nursing home for fall  Imaging negative for any abnormalities  Blood work is unremarkable  Patient ambulatory here with a walker  Discharge to nursing home  Disposition  Final diagnoses:   Fall, initial encounter     Time reflects when diagnosis was documented in both MDM as applicable and the Disposition within this note     Time User Action Codes Description Comment    7/7/2022  8:23 PM Paul Price Add [W19  Lavetta Soy, initial encounter       ED Disposition     ED Disposition   Discharge    Condition   Stable    Date/Time   Thu Jul 7, 2022  8:23 PM    Comment   Arturo Celis discharge to home/self care                 Follow-up Information     Follow up With Specialties Details Why [address] Emergency Department Emergency Medicine  If symptoms worsen [address]  [place] [id number]  [facility] Emergency Department, [address], [address] [place] [address], [address]          Patient's Medications   Discharge Prescriptions    No medications on file       No discharge procedures on file      PDMP Review     None          ED Provider  Electronically Signed by           Washington Dawn MD  07/07/22 4901

## 2022-07-08 NOTE — ED NOTES
Pt ambulated with walker and assist x1 per Dr Antonette Moore request, pt ambulated at baseline level, MD made aware, will prepare pt for discharge       Elif Andujar RN  07/07/22 2038

## 2022-07-08 NOTE — ED NOTES
Pt  Needed to await discharge ride home    Pt  Jennifer Gonzalez up by [id number]     615 Yoel Garza RN  07/08/22 5411

## 2022-07-14 PROCEDURE — U0005 INFEC AGEN DETEC AMPLI PROBE: HCPCS | Performed by: INTERNAL MEDICINE

## 2022-07-14 PROCEDURE — U0003 INFECTIOUS AGENT DETECTION BY NUCLEIC ACID (DNA OR RNA); SEVERE ACUTE RESPIRATORY SYNDROME CORONAVIRUS 2 (SARS-COV-2) (CORONAVIRUS DISEASE [COVID-19]), AMPLIFIED PROBE TECHNIQUE, MAKING USE OF HIGH THROUGHPUT TECHNOLOGIES AS DESCRIBED BY CMS-2020-01-R: HCPCS | Performed by: INTERNAL MEDICINE

## 2022-07-15 ENCOUNTER — LAB REQUISITION (OUTPATIENT)
Dept: LAB | Facility: HOSPITAL | Age: 87
End: 2022-07-15
Payer: MEDICARE

## 2022-07-15 DIAGNOSIS — Z11.59 ENCOUNTER FOR SCREENING FOR OTHER VIRAL DISEASES: ICD-10-CM

## 2022-07-15 DIAGNOSIS — Z03.818 ENCOUNTER FOR OBSERVATION FOR SUSPECTED EXPOSURE TO OTHER BIOLOGICAL AGENTS RULED OUT: ICD-10-CM

## 2022-07-15 LAB — SARS-COV-2 RNA RESP QL NAA+PROBE: NEGATIVE

## 2022-07-16 ENCOUNTER — HOSPITAL ENCOUNTER (EMERGENCY)
Facility: HOSPITAL | Age: 87
Discharge: HOME | End: 2022-07-17
Attending: EMERGENCY MEDICINE
Payer: MEDICARE

## 2022-07-16 DIAGNOSIS — R73.9 HYPERGLYCEMIA: Primary | ICD-10-CM

## 2022-07-16 DIAGNOSIS — L03.90 CELLULITIS: ICD-10-CM

## 2022-07-16 LAB
ALBUMIN SERPL BCP-MCNC: 3.2 G/DL (ref 3.5–5)
ALP SERPL-CCNC: 84 U/L (ref 34–104)
ALT SERPL W P-5'-P-CCNC: 11 U/L (ref 7–52)
ANION GAP SERPL CALCULATED.3IONS-SCNC: 8 MMOL/L (ref 4–13)
AST SERPL W P-5'-P-CCNC: 11 U/L (ref 13–39)
BASE EX.OXY STD BLDV CALC-SCNC: 83.5 % (ref 60–80)
BASE EXCESS BLDV CALC-SCNC: 0.6 MMOL/L
BASOPHILS # BLD MANUAL: 0 THOUSAND/UL (ref 0–0.1)
BASOPHILS NFR MAR MANUAL: 0 % (ref 0–1)
BETA-HYDROXYBUTYRATE: 0.2 MMOL/L
BILIRUB SERPL-MCNC: 0.43 MG/DL (ref 0.2–1)
BUN SERPL-MCNC: 35 MG/DL (ref 5–25)
CALCIUM ALBUM COR SERPL-MCNC: 9.6 MG/DL (ref 8.3–10.1)
CALCIUM SERPL-MCNC: 9 MG/DL (ref 8.4–10.2)
CHLORIDE SERPL-SCNC: 97 MMOL/L (ref 96–108)
CO2 SERPL-SCNC: 25 MMOL/L (ref 21–32)
CREAT SERPL-MCNC: 1.58 MG/DL (ref 0.6–1.3)
EOSINOPHIL # BLD MANUAL: 0.16 THOUSAND/UL (ref 0–0.4)
EOSINOPHIL NFR BLD MANUAL: 2 % (ref 0–6)
ERYTHROCYTE [DISTWIDTH] IN BLOOD BY AUTOMATED COUNT: 15 % (ref 11.6–15.1)
GFR SERPL CREATININE-BSD FRML MDRD: 29 ML/MIN/1.73SQ M
GLUCOSE SERPL-MCNC: 553 MG/DL (ref 65–140)
HCO3 BLDV-SCNC: 25.2 MMOL/L (ref 24–30)
HCT VFR BLD AUTO: 30.3 % (ref 34.8–46.1)
HGB BLD-MCNC: 9.3 G/DL (ref 11.5–15.4)
LYMPHOCYTES # BLD AUTO: 1.52 THOUSAND/UL (ref 0.6–4.47)
LYMPHOCYTES # BLD AUTO: 19 % (ref 14–44)
MCH RBC QN AUTO: 28.3 PG (ref 26.8–34.3)
MCHC RBC AUTO-ENTMCNC: 30.7 G/DL (ref 31.4–37.4)
MCV RBC AUTO: 92 FL (ref 82–98)
MONOCYTES # BLD AUTO: 0.8 THOUSAND/UL (ref 0–1.22)
MONOCYTES NFR BLD: 10 % (ref 4–12)
NEUTROPHILS # BLD MANUAL: 5.52 THOUSAND/UL (ref 1.85–7.62)
NEUTS SEG NFR BLD AUTO: 69 % (ref 43–75)
O2 CT BLDV-SCNC: 12.5 ML/DL
PCO2 BLDV: 40.7 MM HG (ref 42–50)
PH BLDV: 7.41 [PH] (ref 7.3–7.4)
PLATELET # BLD AUTO: 328 THOUSANDS/UL (ref 149–390)
PLATELET BLD QL SMEAR: ADEQUATE
PMV BLD AUTO: 9.8 FL (ref 8.9–12.7)
PO2 BLDV: 50.2 MM HG (ref 35–45)
POTASSIUM SERPL-SCNC: 4.3 MMOL/L (ref 3.5–5.3)
PROT SERPL-MCNC: 7.1 G/DL (ref 6.4–8.4)
RBC # BLD AUTO: 3.29 MILLION/UL (ref 3.81–5.12)
RBC MORPH BLD: NORMAL
SODIUM SERPL-SCNC: 130 MMOL/L (ref 135–147)
WBC # BLD AUTO: 8 THOUSAND/UL (ref 4.31–10.16)

## 2022-07-16 PROCEDURE — 82010 KETONE BODYS QUAN: CPT | Performed by: EMERGENCY MEDICINE

## 2022-07-16 PROCEDURE — 96365 THER/PROPH/DIAG IV INF INIT: CPT

## 2022-07-16 PROCEDURE — 85027 COMPLETE CBC AUTOMATED: CPT | Performed by: EMERGENCY MEDICINE

## 2022-07-16 PROCEDURE — 80053 COMPREHEN METABOLIC PANEL: CPT | Performed by: EMERGENCY MEDICINE

## 2022-07-16 PROCEDURE — 93005 ELECTROCARDIOGRAM TRACING: CPT

## 2022-07-16 PROCEDURE — 82805 BLOOD GASES W/O2 SATURATION: CPT | Performed by: EMERGENCY MEDICINE

## 2022-07-16 PROCEDURE — 99285 EMERGENCY DEPT VISIT HI MDM: CPT | Performed by: EMERGENCY MEDICINE

## 2022-07-16 PROCEDURE — 99284 EMERGENCY DEPT VISIT MOD MDM: CPT

## 2022-07-16 PROCEDURE — 85007 BL SMEAR W/DIFF WBC COUNT: CPT | Performed by: EMERGENCY MEDICINE

## 2022-07-16 PROCEDURE — 82948 REAGENT STRIP/BLOOD GLUCOSE: CPT

## 2022-07-16 PROCEDURE — 36415 COLL VENOUS BLD VENIPUNCTURE: CPT | Performed by: EMERGENCY MEDICINE

## 2022-07-16 RX ORDER — CEFTRIAXONE 1 G/50ML
1000 INJECTION, SOLUTION INTRAVENOUS ONCE
Status: COMPLETED | OUTPATIENT
Start: 2022-07-16 | End: 2022-07-17

## 2022-07-16 RX ADMIN — CEFTRIAXONE 1000 MG: 1 INJECTION, SOLUTION INTRAVENOUS at 23:36

## 2022-07-16 RX ADMIN — SODIUM CHLORIDE 500 ML: 0.9 INJECTION, SOLUTION INTRAVENOUS at 23:11

## 2022-07-17 VITALS
HEIGHT: 64 IN | OXYGEN SATURATION: 97 % | SYSTOLIC BLOOD PRESSURE: 191 MMHG | RESPIRATION RATE: 18 BRPM | TEMPERATURE: 97.3 F | BODY MASS INDEX: 27.25 KG/M2 | HEART RATE: 63 BPM | DIASTOLIC BLOOD PRESSURE: 81 MMHG

## 2022-07-17 LAB
ATRIAL RATE: 72 BPM
BACTERIA UR QL AUTO: ABNORMAL /HPF
BILIRUB UR QL STRIP: NEGATIVE
CLARITY UR: ABNORMAL
COLOR UR: ABNORMAL
GLUCOSE SERPL-MCNC: 347 MG/DL (ref 65–140)
GLUCOSE SERPL-MCNC: >500 MG/DL (ref 65–140)
GLUCOSE UR STRIP-MCNC: ABNORMAL MG/DL
HGB UR QL STRIP.AUTO: NEGATIVE
KETONES UR STRIP-MCNC: NEGATIVE MG/DL
LEUKOCYTE ESTERASE UR QL STRIP: NEGATIVE
NITRITE UR QL STRIP: NEGATIVE
NON-SQ EPI CELLS URNS QL MICRO: ABNORMAL /HPF
P AXIS: 60 DEGREES
PH UR STRIP.AUTO: 6 [PH]
PR INTERVAL: 180 MS
PROT UR STRIP-MCNC: ABNORMAL MG/DL
QRS AXIS: -44 DEGREES
QRSD INTERVAL: 146 MS
QT INTERVAL: 472 MS
QTC INTERVAL: 516 MS
RBC #/AREA URNS AUTO: ABNORMAL /HPF
SP GR UR STRIP.AUTO: 1.01 (ref 1–1.03)
T WAVE AXIS: 24 DEGREES
UROBILINOGEN UR QL STRIP.AUTO: 0.2 E.U./DL
VENTRICULAR RATE: 72 BPM
WBC #/AREA URNS AUTO: ABNORMAL /HPF

## 2022-07-17 PROCEDURE — 81001 URINALYSIS AUTO W/SCOPE: CPT | Performed by: EMERGENCY MEDICINE

## 2022-07-17 PROCEDURE — 82948 REAGENT STRIP/BLOOD GLUCOSE: CPT

## 2022-07-17 PROCEDURE — 96372 THER/PROPH/DIAG INJ SC/IM: CPT

## 2022-07-17 PROCEDURE — 96375 TX/PRO/DX INJ NEW DRUG ADDON: CPT

## 2022-07-17 PROCEDURE — 93010 ELECTROCARDIOGRAM REPORT: CPT | Performed by: INTERNAL MEDICINE

## 2022-07-17 RX ORDER — CEPHALEXIN 500 MG/1
500 CAPSULE ORAL EVERY 6 HOURS SCHEDULED
Qty: 28 CAPSULE | Refills: 0 | Status: SHIPPED | OUTPATIENT
Start: 2022-07-17 | End: 2022-07-24

## 2022-07-17 RX ADMIN — INSULIN HUMAN 10 UNITS: 100 INJECTION, SOLUTION PARENTERAL at 00:33

## 2022-07-17 NOTE — ED NOTES
Patient left with all personal belongings, all needs met at time of D/C     Marilu Anderson RN  07/17/22 0406

## 2022-07-17 NOTE — ED PROVIDER NOTES
History  Chief Complaint   Patient presents with    Hyperglycemia - no symptoms     540 pre hospital glucose; sent from SNF; BLE wounds     79 yo presents from skilled nursing facility for evaluation of hyperglycemia  History is limited as patient is a poor historian and has dementia  Per EMS report, patient was noted to have glucose reading greater than 500  Of note, patient was recently admitted to the hospital at the end of June for hypoglycemia and her insulin regimen was decreased at that time  Patient currently denies any complaints at this time  She denies any pain  No nausea or vomiting  No headache, dizziness, or blurry vision  She is noted to have bilateral lower extremity wounds  Unsure patient took her evening insulin prior to arrival           Prior to Admission Medications   Prescriptions Last Dose Informant Patient Reported? Taking?    Bioflavonoid Products (WHITNEY C PO)   Yes No   Sig: Take 1,000 mg by mouth in the morning   Insulin Lispro (HumaLOG) 100 units/mL cartridge for injection   No No   Sig: Inject 5 Units under the skin 3 (three) times a day with meals   Insulin Pen Needle 30G X 8 MM MISC   Yes No   Sig: TO BE USED WITH LEVEMIR INSULIN AT BEDTIME   Loperamide-Simethicone (IMODIUM MULTI-SYMPTOM RELIEF PO)   Yes No   Sig: Take 1 capsule by mouth Not to exceed 8 tablets/24 hours   acetaminophen (TYLENOL) 500 mg tablet   Yes No   Sig: Take 500 mg by mouth every 6 (six) hours as needed for mild pain   docusate sodium (COLACE) 100 mg capsule   No No   Sig: Take 1 capsule (100 mg total) by mouth 2 (two) times a day   ferrous gluconate (FERGON) 324 mg tablet   No No   Sig: Take 1 tablet (324 mg total) by mouth 2 (two) times a day before meals   folic acid (FOLVITE) 1 mg tablet   Yes No   Sig: Take by mouth daily   furosemide (LASIX) 20 mg tablet   Yes No   Sig: Take 20 mg by mouth daily   gabapentin (NEURONTIN) 100 mg capsule   Yes No   Sig: Take 100 mg by mouth in the morning and 100 mg in the evening and 100 mg before bedtime  glucose blood (Glucocard Vital Test) test strip   No No   Sig: Use 1 each 4 (four) times a day (before meals and at bedtime) Use as instructed   hydroxychloroquine (PLAQUENIL) 200 mg tablet   Yes No   Sig: Take 200 mg by mouth 2 (two) times a day with meals   insulin detemir (LEVEMIR) 100 units/mL subcutaneous injection   No No   Sig: Inject 20 Units under the skin daily at bedtime   levothyroxine 50 mcg tablet   Yes No   Sig: Take 50 mcg by mouth in the morning  losartan (COZAAR) 50 mg tablet   Yes No   Sig: Take 50 mg by mouth in the morning  methotrexate 2 5 mg tablet   Yes No   Sig: Take 2 5 mg by mouth once a week Sundays   metoprolol tartrate (LOPRESSOR) 25 mg tablet   Yes No   Sig: Take 25 mg by mouth every 12 (twelve) hours   potassium chloride (Klor-Con) 10 mEq tablet   Yes No   Sig: Take 10 mEq by mouth in the morning      Facility-Administered Medications: None       Past Medical History:   Diagnosis Date    Dementia ([facility] )     Diabetes mellitus ([place] [address] )     Disease of thyroid gland     Hypertension     Rheumatoid arthritis ([place] [address] )        History reviewed  No pertinent surgical history  History reviewed  No pertinent family history  I have reviewed and agree with the history as documented  E-Cigarette/Vaping    E-Cigarette Use Never User      E-Cigarette/Vaping Substances    Nicotine No     THC No     CBD No     Flavoring No     Other No     Unknown No      Social History     Tobacco Use    Smoking status: Never Smoker    Smokeless tobacco: Never Used   Vaping Use    Vaping Use: Never used   Substance Use Topics    Alcohol use: Not Currently    Drug use: Never       Review of Systems   Unable to perform ROS: Dementia   Skin: Positive for color change, rash and wound  Physical Exam  Physical Exam  Vitals and nursing note reviewed  Constitutional:       Appearance: She is obese        Comments: Chronically ill-appearing   HENT:      Head: Normocephalic and atraumatic  Right Ear: External ear normal       Left Ear: External ear normal       Nose: Nose normal       Mouth/Throat:      Mouth: Mucous membranes are moist    Eyes:      Extraocular Movements: Extraocular movements intact  Conjunctiva/sclera: Conjunctivae normal       Pupils: Pupils are equal, round, and reactive to light  Cardiovascular:      Rate and Rhythm: Normal rate and regular rhythm  Pulses: Normal pulses  Heart sounds: No murmur heard  Pulmonary:      Effort: Pulmonary effort is normal  No respiratory distress  Breath sounds: Normal breath sounds  No stridor  Abdominal:      General: Abdomen is flat  Bowel sounds are normal       Tenderness: There is no abdominal tenderness  There is no guarding or rebound  Musculoskeletal:      Cervical back: Normal range of motion and neck supple  Comments: Bilateral lower extremity edema, erythema in the right shin and calf that is warm to the touch in blanchable  Multiple wounds present without purulent drainage, see images   Skin:     General: Skin is warm and dry  Capillary Refill: Capillary refill takes less than 2 seconds  Neurological:      General: No focal deficit present  Mental Status: She is alert  Mental status is at baseline  Comments:  Follows simple commands   Psychiatric:         Mood and Affect: Mood normal          Behavior: Behavior normal                      Vital Signs  ED Triage Vitals [07/16/22 2229]   Temperature Pulse Respirations Blood Pressure SpO2   (!) 97 3 °F (36 3 °C) 74 18 (!) 217/85 95 %      Temp Source Heart Rate Source Patient Position - Orthostatic VS BP Location FiO2 (%)   Tympanic Monitor Sitting Left arm --      Pain Score       No Pain           Vitals:    07/16/22 2229 07/16/22 2312 07/17/22 0032 07/17/22 0229   BP: (!) 217/85 (!) 188/74  (!) 191/81   Pulse: 74 68 66 63   Patient Position - Orthostatic VS: Sitting Lying  Lying         Visual Acuity      ED Medications  Medications   sodium chloride 0 9 % bolus 500 mL (0 mL Intravenous Stopped 7/17/22 0006)   insulin regular (HumuLIN R,NovoLIN R) injection 10 Units (10 Units Subcutaneous Given 7/17/22 0033)   cefTRIAXone (ROCEPHIN) IVPB (premix in dextrose) 1,000 mg 50 mL (0 mg Intravenous Stopped 7/17/22 0006)   insulin regular (HumuLIN R,NovoLIN R) injection 10 Units (10 Units Intravenous Given 7/17/22 0233)       Diagnostic Studies  Results Reviewed     Procedure Component Value Units Date/Time    Fingerstick Glucose (POCT) [[id number]]  (Abnormal) Collected: 07/17/22 0324    Lab Status: Final result Updated: 07/17/22 0326     POC Glucose 347 mg/dl     Urine Microscopic [[id number]]  (Abnormal) Collected: 07/17/22 0224    Lab Status: Final result Specimen: Urine, Other Updated: 07/17/22 0258     RBC, UA None Seen /hpf      WBC, UA 10-20 /hpf      Epithelial Cells Occasional /hpf      Bacteria, UA Innumerable /hpf     UA (URINE) with reflex to Scope [[id number]]  (Abnormal) Collected: 07/17/22 0224    Lab Status: Final result Specimen: Urine, Other Updated: 07/17/22 0249     Color, UA Straw     Clarity, UA Slightly Cloudy     Specific Gravity, UA 1 015     pH, UA 6 0     Leukocytes, UA Negative     Nitrite, UA Negative     Protein, UA Trace mg/dl      Glucose, UA 3+ mg/dl      Ketones, UA Negative mg/dl      Urobilinogen, UA 0 2 E U /dl      Bilirubin, UA Negative     Occult Blood, UA Negative    Fingerstick Glucose (POCT) [[id number]]  (Abnormal) Collected: 07/17/22 0117    Lab Status: Final result Updated: 07/17/22 0119     POC Glucose >500 mg/dl     CBC and differential [[id number]]  (Abnormal) Collected: 07/16/22 2246    Lab Status: Final result Specimen: Blood from Arm, Left Updated: 07/16/22 2342     WBC 8 00 Thousand/uL      RBC 3 29 Million/uL      Hemoglobin 9 3 g/dL      Hematocrit 30 3 %      MCV 92 fL      MCH 28 3 pg      MCHC 30 7 g/dL      RDW 15 0 %      MPV 9 8 fL      Platelets 319 Thousands/uL     Narrative: This is an appended report  These results have been appended to a previously verified report      Manual Differential(PHLEBS Do Not Order) [[id number]] Collected: 07/16/22 2246    Lab Status: Final result Specimen: Blood from Arm, Left Updated: 07/16/22 2342     Segmented % 69 %      Lymphocytes % 19 %      Monocytes % 10 %      Eosinophils, % 2 %      Basophils % 0 %      Absolute Neutrophils 5 52 Thousand/uL      Lymphocytes Absolute 1 52 Thousand/uL      Monocytes Absolute 0 80 Thousand/uL      Eosinophils Absolute 0 16 Thousand/uL      Basophils Absolute 0 00 Thousand/uL      Total Counted --     RBC Morphology Normal     Platelet Estimate Adequate    Comprehensive metabolic panel [[id number]]  (Abnormal) Collected: 07/16/22 2246    Lab Status: Final result Specimen: Blood from Arm, Left Updated: 07/16/22 2321     Sodium 130 mmol/L      Potassium 4 3 mmol/L      Chloride 97 mmol/L      CO2 25 mmol/L      ANION GAP 8 mmol/L      BUN 35 mg/dL      Creatinine 1 58 mg/dL      Glucose 553 mg/dL      Calcium 9 0 mg/dL      Corrected Calcium 9 6 mg/dL      AST 11 U/L      ALT 11 U/L      Alkaline Phosphatase 84 U/L      Total Protein 7 1 g/dL      Albumin 3 2 g/dL      Total Bilirubin 0 43 mg/dL      eGFR 29 ml/min/1 73sq m     Narrative:      National Kidney Disease Foundation guidelines for Chronic Kidney Disease (CKD):     Stage 1 with normal or high GFR (GFR > 90 mL/min/1 73 square meters)    Stage 2 Mild CKD (GFR = 60-89 mL/min/1 73 square meters)    Stage 3A Moderate CKD (GFR = 45-59 mL/min/1 73 square meters)    Stage 3B Moderate CKD (GFR = 30-44 mL/min/1 73 square meters)    Stage 4 Severe CKD (GFR = 15-29 mL/min/1 73 square meters)    Stage 5 End Stage CKD (GFR <15 mL/min/1 73 square meters)  Note: GFR calculation is accurate only with a steady state creatinine    Beta Hydroxybutyrate [[id number]]  (Normal) Collected: 07/16/22 2246    Lab Status: Final result Specimen: Blood from Arm, Left Updated: 07/16/22 2257     BETA-HYDROXYBUTYRATE 0 2 mmol/L     Blood gas, venous [[id number]]  (Abnormal) Collected: 07/16/22 2246    Lab Status: Final result Specimen: Blood from Arm, Left Updated: 07/16/22 2255     pH, Kranthi 7 410     pCO2, Kranthi 40 7 mm Hg      pO2, Kranthi 50 2 mm Hg      HCO3, Kranthi 25 2 mmol/L      Base Excess, Kranthi 0 6 mmol/L      O2 Content, Kranthi 12 5 ml/dL      O2 HGB, VENOUS 83 5 %                  No orders to display              Procedures  ECG 12 Lead Documentation Only    Date/Time: 7/17/2022 12:03 AM  Performed by: Graciela Caldwell MD  Authorized by: Graciela Caldwell MD     Indications / Diagnosis:  Hyperglycemia  ECG reviewed by me, the ED Provider: yes    Patient location:  ED  Previous ECG:     Previous ECG:  Compared to current    Similarity:  No change  Interpretation:     Interpretation: abnormal    Rate:     ECG rate assessment: normal    Rhythm:     Rhythm: sinus rhythm    Ectopy:     Ectopy: none    QRS:     QRS axis:  Left  Conduction:     Conduction: abnormal      Abnormal conduction: complete RBBB    ST segments:     ST segments:  Normal  T waves:     T waves: normal    Comments:      Normal sinus rhythm with a rate of 72, normal intervals, left axis deviation, right bundle-branch block, unchanged from previous             ED Course  ED Course as of 07/17/22 0458   Sat Jul 16, 2022   2258 Hemoglobin(!): 9 3  Stable   2258 BETA-HYDROXYBUTYRATE: 0 2   2322 Creatinine(!): 1 58  Stable   2322 Glucose, Random(!!): 553   2322 Sodium(!): 130  137 corrected   2330 The wounds in the bilateral lower extremities appear superficial, there is no purulence drainage, the wounds do not probe down to the bone   Sun Jul 17, 2022   0058 Awaiting urine sample   0119 POC Glucose(!!): >500   0253 Leukocytes, UA: Negative   0253 Nitrite, UA: Negative   0253 Ketones, UA: Negative   0328 POC Glucose(!): 347                               SBIRT 22yo+    Flowsheet Row Most Recent Value   SBIRT (21 yo +)    In order to provide better care to our patients, we are screening all of our patients for alcohol and drug use  Would it be okay to ask you these screening questions? Yes Filed at: 07/16/2022 2313   Initial Alcohol Screen: US AUDIT-C     1  How often do you have a drink containing alcohol? 0 Filed at: 07/16/2022 2313   2  How many drinks containing alcohol do you have on a typical day you are drinking? 0 Filed at: 07/16/2022 2313   3a  Male UNDER 65: How often do you have five or more drinks on one occasion? 0 Filed at: 07/16/2022 2313   3b  FEMALE Any Age, or MALE 65+: How often do you have 4 or more drinks on one occassion? 0 Filed at: 07/16/2022 2313   Audit-C Score 0 Filed at: 07/16/2022 2313   RAMIREZ: How many times in the past year have you    Used an illegal drug or used a prescription medication for non-medical reasons? Never Filed at: 07/16/2022 2313                    MDM  Number of Diagnoses or Management Options  Cellulitis  Hyperglycemia  Diagnosis management comments: 27-year-old male with insulin dependent diabetes presents from nursing facility for evaluation of hyperglycemia  On exam she is chronically ill-appearing but in no acute distress  She denies any current complaints at this time  Initial fingerstick glucose was greater than 500  Will check labs to evaluate for DKA  It appears that patient has right leg cellulitis as well and will require antibiotics  Labs grossly unremarkable other than hyperglycemia  No anion gap or ketones present  Urinalysis did demonstrate innumerable bacteria, patient will be covered with antibiotics for the cellulitis which will also treat UTI  Will plan to discharge home with antibiotics for the cellulitis and frequent glucose monitoring at the nursing facility  Written return precautions were provided  Patient should have close follow-up with primary care physician for further insulin adjustments    Ambulatory referral placed for wound care     ED discharge glucose in patients with moderate to severe hyperglycemia was not associated with 7-day outcomes of repeat ED visit for hyperglycemia or hospitalization  Attaining a specific glucose goal before discharge in patients does not serve a function and so will not be pursued  ([address] 2016 Jun [birth year]  pii: -[id number]  doi: 10 1016/j annemered  2016 04 057 )       Disposition  Final diagnoses:   Hyperglycemia   Cellulitis     Time reflects when diagnosis was documented in both MDM as applicable and the Disposition within this note     Time User Action Codes Description Comment    7/17/2022 12:22 AM Check, Michael Todd [R73 9] Hyperglycemia     7/17/2022 12:22 AM Check, Audrey Conteh [L03 90] Cellulitis       ED Disposition     ED Disposition   Discharge    Condition   Stable    Date/Time   Sun Jul 17, 2022  4:03 AM    Comment   Drew Celis discharge to home/self care       Transported by [facility]           Follow-up Information     Follow up With Specialties Details Why Contact Info Additional Information    [address] Wound Care Schedule an appointment as soon as possible for a visit   [address] [place] [address]  [id number]  [phone] [address], [address] [address], [address] [facility] Emergency Department Emergency Medicine  If symptoms worsen [address] [place] [address]  [facility] Emergency Department, [address] [facility], [address] [facility]    Jayesh Guido DO Family Medicine Schedule an appointment as soon as possible for a visit   Halley Foley  [phone]             Discharge Medication List as of 7/17/2022  3:09 AM      START taking these medications    Details   cephalexin (KEFLEX) 500 mg capsule Take 1 capsule (500 mg total) by mouth every 6 (six) hours for 7 days, Starting [facility] 7/17/2022, Until [facility] 7/24/2022, Normal         CONTINUE these medications which have NOT CHANGED    Details   acetaminophen (TYLENOL) 500 mg tablet Take 500 mg by mouth every 6 (six) hours as needed for mild pain, Historical Med      Bioflavonoid Products (WHITNEY C PO) Take 1,000 mg by mouth in the morning, Historical Med      docusate sodium (COLACE) 100 mg capsule Take 1 capsule (100 mg total) by mouth 2 (two) times a day, Starting Tue 6/28/2022, Until Thu 7/28/2022, Normal      ferrous gluconate (FERGON) 324 mg tablet Take 1 tablet (324 mg total) by mouth 2 (two) times a day before meals, Starting Wed 4/52/5128, Normal      folic acid (FOLVITE) 1 mg tablet Take by mouth daily, Historical Med      furosemide (LASIX) 20 mg tablet Take 20 mg by mouth daily, Historical Med      gabapentin (NEURONTIN) 100 mg capsule Take 100 mg by mouth in the morning and 100 mg in the evening and 100 mg before bedtime  , Historical Med      glucose blood (Glucocard Vital Test) test strip Use 1 each 4 (four) times a day (before meals and at bedtime) Use as instructed, Starting Tue 6/28/2022, Normal      hydroxychloroquine (PLAQUENIL) 200 mg tablet Take 200 mg by mouth 2 (two) times a day with meals, Historical Med      insulin detemir (LEVEMIR) 100 units/mL subcutaneous injection Inject 20 Units under the skin daily at bedtime, Starting Tue 6/28/2022, Until Thu 7/28/2022, Normal      Insulin Lispro (HumaLOG) 100 units/mL cartridge for injection Inject 5 Units under the skin 3 (three) times a day with meals, Starting Tue 6/28/2022, Normal      Insulin Pen Needle 30G X 8 MM MISC TO BE USED WITH LEVEMIR INSULIN AT BEDTIME, Historical Med      levothyroxine 50 mcg tablet Take 50 mcg by mouth in the morning , Historical Med      Loperamide-Simethicone (IMODIUM MULTI-SYMPTOM RELIEF PO) Take 1 capsule by mouth Not to exceed 8 tablets/24 hours, Historical Med      losartan (COZAAR) 50 mg tablet Take 50 mg by mouth in the morning , Historical Med      methotrexate 2 5 mg tablet Take 2 5 mg by mouth once a week Sundays, Historical Med      metoprolol tartrate (LOPRESSOR) 25 mg tablet Take 25 mg by mouth every 12 (twelve) hours, Historical Med      potassium chloride (Klor-Con) 10 mEq tablet Take 10 mEq by mouth in the morning, Historical Med                 PDMP Review     None          ED Provider  Electronically Signed by           Avelino Brennan MD  07/17/22 5013

## 2022-07-17 NOTE — DISCHARGE INSTRUCTIONS
Take Keflex as prescribed 1 tablet, 4 times daily for 7 days    Recommend frequent glucose checks  Take insulin as previously prescribed  Ambulatory referral for wound care was placed    Follow-up with primary care physician for repeat evaluation and to discuss changes in your insulin regimen

## 2022-07-25 PROCEDURE — U0005 INFEC AGEN DETEC AMPLI PROBE: HCPCS | Performed by: INTERNAL MEDICINE

## 2022-07-25 PROCEDURE — U0003 INFECTIOUS AGENT DETECTION BY NUCLEIC ACID (DNA OR RNA); SEVERE ACUTE RESPIRATORY SYNDROME CORONAVIRUS 2 (SARS-COV-2) (CORONAVIRUS DISEASE [COVID-19]), AMPLIFIED PROBE TECHNIQUE, MAKING USE OF HIGH THROUGHPUT TECHNOLOGIES AS DESCRIBED BY CMS-2020-01-R: HCPCS | Performed by: INTERNAL MEDICINE

## 2022-07-26 ENCOUNTER — LAB REQUISITION (OUTPATIENT)
Dept: LAB | Facility: HOSPITAL | Age: 87
End: 2022-07-26
Payer: MEDICARE

## 2022-07-26 ENCOUNTER — TELEPHONE (OUTPATIENT)
Dept: LAB | Facility: HOSPITAL | Age: 87
End: 2022-07-26

## 2022-07-26 DIAGNOSIS — Z03.818 ENCOUNTER FOR OBSERVATION FOR SUSPECTED EXPOSURE TO OTHER BIOLOGICAL AGENTS RULED OUT: ICD-10-CM

## 2022-07-26 DIAGNOSIS — Z11.59 ENCOUNTER FOR SCREENING FOR OTHER VIRAL DISEASES: ICD-10-CM

## 2022-07-26 LAB — SARS-COV-2 RNA RESP QL NAA+PROBE: NEGATIVE

## 2022-07-27 ENCOUNTER — APPOINTMENT (EMERGENCY)
Dept: CT IMAGING | Facility: HOSPITAL | Age: 87
End: 2022-07-27
Payer: MEDICARE

## 2022-07-27 ENCOUNTER — HOSPITAL ENCOUNTER (EMERGENCY)
Facility: HOSPITAL | Age: 87
Discharge: HOME | End: 2022-07-27
Attending: EMERGENCY MEDICINE
Payer: MEDICARE

## 2022-07-27 VITALS
SYSTOLIC BLOOD PRESSURE: 191 MMHG | DIASTOLIC BLOOD PRESSURE: 75 MMHG | RESPIRATION RATE: 16 BRPM | TEMPERATURE: 97.8 F | HEART RATE: 90 BPM | OXYGEN SATURATION: 98 %

## 2022-07-27 DIAGNOSIS — N39.0 UTI (URINARY TRACT INFECTION): ICD-10-CM

## 2022-07-27 DIAGNOSIS — E11.65 HYPERGLYCEMIA DUE TO DIABETES MELLITUS (HCC): ICD-10-CM
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
## 2022-07-27 LAB
2HR DELTA HS TROPONIN: -8 NG/L
ALBUMIN SERPL BCP-MCNC: 3.3 G/DL (ref 3.5–5)
ALP SERPL-CCNC: 83 U/L (ref 34–104)
ALT SERPL W P-5'-P-CCNC: 14 U/L (ref 7–52)
ANION GAP SERPL CALCULATED.3IONS-SCNC: 7 MMOL/L (ref 4–13)
AST SERPL W P-5'-P-CCNC: 17 U/L (ref 13–39)
ATRIAL RATE: 86 BPM
BACTERIA UR QL AUTO: ABNORMAL /HPF
BASE EX.OXY STD BLDV CALC-SCNC: 79.2 % (ref 60–80)
BASE EXCESS BLDV CALC-SCNC: -4.6 MMOL/L
BASOPHILS # BLD AUTO: 0.03 THOUSANDS/ΜL (ref 0–0.1)
BASOPHILS NFR BLD AUTO: 0 % (ref 0–1)
BETA-HYDROXYBUTYRATE: 0.5 MMOL/L
BILIRUB SERPL-MCNC: 0.34 MG/DL (ref 0.2–1)
BILIRUB UR QL STRIP: NEGATIVE
BNP SERPL-MCNC: 263 PG/ML (ref 0–100)
BUN SERPL-MCNC: 48 MG/DL (ref 5–25)
CALCIUM ALBUM COR SERPL-MCNC: 9.8 MG/DL (ref 8.3–10.1)
CALCIUM SERPL-MCNC: 9.2 MG/DL (ref 8.4–10.2)
CARDIAC TROPONIN I PNL SERPL HS: 109 NG/L
CARDIAC TROPONIN I PNL SERPL HS: 117 NG/L
CHLORIDE SERPL-SCNC: 103 MMOL/L (ref 96–108)
CLARITY UR: ABNORMAL
CO2 SERPL-SCNC: 25 MMOL/L (ref 21–32)
COLOR UR: ABNORMAL
CREAT SERPL-MCNC: 1.69 MG/DL (ref 0.6–1.3)
EOSINOPHIL # BLD AUTO: 0 THOUSAND/ΜL (ref 0–0.61)
EOSINOPHIL NFR BLD AUTO: 0 % (ref 0–6)
ERYTHROCYTE [DISTWIDTH] IN BLOOD BY AUTOMATED COUNT: 15.5 % (ref 11.6–15.1)
GFR SERPL CREATININE-BSD FRML MDRD: 26 ML/MIN/1.73SQ M
GLUCOSE SERPL-MCNC: 363 MG/DL (ref 65–140)
GLUCOSE SERPL-MCNC: 438 MG/DL (ref 65–140)
GLUCOSE UR STRIP-MCNC: ABNORMAL MG/DL
HCO3 BLDV-SCNC: 20.6 MMOL/L (ref 24–30)
HCT VFR BLD AUTO: 33.2 % (ref 34.8–46.1)
HGB BLD-MCNC: 10.1 G/DL (ref 11.5–15.4)
HGB UR QL STRIP.AUTO: ABNORMAL
IMM GRANULOCYTES # BLD AUTO: 0.08 THOUSAND/UL (ref 0–0.2)
IMM GRANULOCYTES NFR BLD AUTO: 1 % (ref 0–2)
KETONES UR STRIP-MCNC: NEGATIVE MG/DL
LACTATE SERPL-SCNC: 1.1 MMOL/L (ref 0.5–2)
LEUKOCYTE ESTERASE UR QL STRIP: ABNORMAL
LIPASE SERPL-CCNC: 37 U/L (ref 11–82)
LYMPHOCYTES # BLD AUTO: 1.78 THOUSANDS/ΜL (ref 0.6–4.47)
LYMPHOCYTES NFR BLD AUTO: 23 % (ref 14–44)
MAGNESIUM SERPL-MCNC: 1.9 MG/DL (ref 1.9–2.7)
MCH RBC QN AUTO: 28.5 PG (ref 26.8–34.3)
MCHC RBC AUTO-ENTMCNC: 30.4 G/DL (ref 31.4–37.4)
MCV RBC AUTO: 94 FL (ref 82–98)
MONOCYTES # BLD AUTO: 0.52 THOUSAND/ΜL (ref 0.17–1.22)
MONOCYTES NFR BLD AUTO: 7 % (ref 4–12)
NEUTROPHILS # BLD AUTO: 5.42 THOUSANDS/ΜL (ref 1.85–7.62)
NEUTS SEG NFR BLD AUTO: 69 % (ref 43–75)
NITRITE UR QL STRIP: NEGATIVE
NON-SQ EPI CELLS URNS QL MICRO: ABNORMAL /HPF
NRBC BLD AUTO-RTO: 0 /100 WBCS
O2 CT BLDV-SCNC: 12.5 ML/DL
P AXIS: 92 DEGREES
PCO2 BLDV: 38.5 MM HG (ref 42–50)
PH BLDV: 7.35 [PH] (ref 7.3–7.4)
PH UR STRIP.AUTO: 6 [PH]
PLATELET # BLD AUTO: 330 THOUSANDS/UL (ref 149–390)
PMV BLD AUTO: 10.1 FL (ref 8.9–12.7)
PO2 BLDV: 46.8 MM HG (ref 35–45)
POTASSIUM SERPL-SCNC: 4.8 MMOL/L (ref 3.5–5.3)
PR INTERVAL: 206 MS
PROT SERPL-MCNC: 7.2 G/DL (ref 6.4–8.4)
PROT UR STRIP-MCNC: ABNORMAL MG/DL
QRS AXIS: -49 DEGREES
QRSD INTERVAL: 136 MS
QT INTERVAL: 436 MS
QTC INTERVAL: 521 MS
RBC # BLD AUTO: 3.55 MILLION/UL (ref 3.81–5.12)
RBC #/AREA URNS AUTO: ABNORMAL /HPF
SODIUM SERPL-SCNC: 135 MMOL/L (ref 135–147)
SP GR UR STRIP.AUTO: 1.02 (ref 1–1.03)
T WAVE AXIS: 42 DEGREES
UROBILINOGEN UR QL STRIP.AUTO: 0.2 E.U./DL
VENTRICULAR RATE: 86 BPM
WBC # BLD AUTO: 7.83 THOUSAND/UL (ref 4.31–10.16)
WBC #/AREA URNS AUTO: ABNORMAL /HPF

## 2022-07-27 PROCEDURE — 99285 EMERGENCY DEPT VISIT HI MDM: CPT | Performed by: EMERGENCY MEDICINE

## 2022-07-27 PROCEDURE — 82948 REAGENT STRIP/BLOOD GLUCOSE: CPT

## 2022-07-27 PROCEDURE — 81001 URINALYSIS AUTO W/SCOPE: CPT | Performed by: EMERGENCY MEDICINE

## 2022-07-27 PROCEDURE — 70450 CT HEAD/BRAIN W/O DYE: CPT

## 2022-07-27 PROCEDURE — 83735 ASSAY OF MAGNESIUM: CPT | Performed by: EMERGENCY MEDICINE

## 2022-07-27 PROCEDURE — 82805 BLOOD GASES W/O2 SATURATION: CPT | Performed by: EMERGENCY MEDICINE

## 2022-07-27 PROCEDURE — 96361 HYDRATE IV INFUSION ADD-ON: CPT

## 2022-07-27 PROCEDURE — G1004 CDSM NDSC: HCPCS

## 2022-07-27 PROCEDURE — 71250 CT THORAX DX C-: CPT

## 2022-07-27 PROCEDURE — 85025 COMPLETE CBC W/AUTO DIFF WBC: CPT | Performed by: EMERGENCY MEDICINE

## 2022-07-27 PROCEDURE — 80053 COMPREHEN METABOLIC PANEL: CPT | Performed by: EMERGENCY MEDICINE

## 2022-07-27 PROCEDURE — 36415 COLL VENOUS BLD VENIPUNCTURE: CPT | Performed by: EMERGENCY MEDICINE

## 2022-07-27 PROCEDURE — 83605 ASSAY OF LACTIC ACID: CPT | Performed by: EMERGENCY MEDICINE

## 2022-07-27 PROCEDURE — 99285 EMERGENCY DEPT VISIT HI MDM: CPT

## 2022-07-27 PROCEDURE — 96374 THER/PROPH/DIAG INJ IV PUSH: CPT

## 2022-07-27 PROCEDURE — 87181 SC STD AGAR DILUTION PER AGT: CPT | Performed by: EMERGENCY MEDICINE

## 2022-07-27 PROCEDURE — 87086 URINE CULTURE/COLONY COUNT: CPT | Performed by: EMERGENCY MEDICINE

## 2022-07-27 PROCEDURE — 72125 CT NECK SPINE W/O DYE: CPT

## 2022-07-27 PROCEDURE — 83880 ASSAY OF NATRIURETIC PEPTIDE: CPT | Performed by: EMERGENCY MEDICINE

## 2022-07-27 PROCEDURE — 81003 URINALYSIS AUTO W/O SCOPE: CPT | Performed by: EMERGENCY MEDICINE

## 2022-07-27 PROCEDURE — 87077 CULTURE AEROBIC IDENTIFY: CPT | Performed by: EMERGENCY MEDICINE

## 2022-07-27 PROCEDURE — 74176 CT ABD & PELVIS W/O CONTRAST: CPT

## 2022-07-27 PROCEDURE — 93005 ELECTROCARDIOGRAM TRACING: CPT

## 2022-07-27 PROCEDURE — 82010 KETONE BODYS QUAN: CPT | Performed by: EMERGENCY MEDICINE

## 2022-07-27 PROCEDURE — 87186 SC STD MICRODIL/AGAR DIL: CPT | Performed by: EMERGENCY MEDICINE

## 2022-07-27 PROCEDURE — 83690 ASSAY OF LIPASE: CPT | Performed by: EMERGENCY MEDICINE

## 2022-07-27 PROCEDURE — 93010 ELECTROCARDIOGRAM REPORT: CPT | Performed by: INTERNAL MEDICINE

## 2022-07-27 PROCEDURE — 84484 ASSAY OF TROPONIN QUANT: CPT | Performed by: EMERGENCY MEDICINE

## 2022-07-27 RX ORDER — CEPHALEXIN 500 MG/1
500 CAPSULE ORAL ONCE
Status: DISCONTINUED | OUTPATIENT
Start: 2022-07-27 | End: 2022-07-27 | Stop reason: HOSPADM

## 2022-07-27 RX ORDER — CEPHALEXIN 500 MG/1
500 CAPSULE ORAL EVERY 8 HOURS SCHEDULED
Qty: 21 CAPSULE | Refills: 0 | Status: SHIPPED | OUTPATIENT
Start: 2022-07-27 | End: 2022-08-03

## 2022-07-27 RX ADMIN — SODIUM CHLORIDE 1000 ML: 0.9 INJECTION, SOLUTION INTRAVENOUS at 04:36

## 2022-07-27 RX ADMIN — INSULIN HUMAN 6 UNITS: 100 INJECTION, SOLUTION PARENTERAL at 05:10

## 2022-07-27 NOTE — ED NOTES
Spoke with Nestor Orta at [facility] for arrangement of transportation  Waiting call back for confirmation       Antoinette Barajas RN  07/27/22 1280

## 2022-07-27 NOTE — ED PROVIDER NOTES
History  Chief Complaint   Patient presents with    Altered Mental Status     Per NH staff, pt "found on floor" by staf  EMS reports pt sitting in chair on arrival   NH staff reports high BS's and HTN  Pt normally confused/dementia       79 yo Female    Brought by EMS for possible fall  Found on floor beside bed at NH, concern for "more confused than usual" - baseline of severe alzheimer dementia       Moving all extremities  No overt trauma   No deformities  No vomiting        History provided by:  EMS personnel  Altered Mental Status  Severity:  Mild  Associated symptoms comment:  Unable to provide hx       Prior to Admission Medications   Prescriptions Last Dose Informant Patient Reported? Taking?    Bioflavonoid Products (WHITNEY C PO) 7/26/2022 at Unknown time  Yes Yes   Sig: Take 1,000 mg by mouth in the morning   Insulin Lispro (HumaLOG) 100 units/mL cartridge for injection 7/26/2022 at Unknown time  No Yes   Sig: Inject 5 Units under the skin 3 (three) times a day with meals   Insulin Pen Needle 30G X 8 MM MISC 7/26/2022 at Unknown time  Yes Yes   Sig: TO BE USED WITH LEVEMIR INSULIN AT BEDTIME   Loperamide-Simethicone (IMODIUM MULTI-SYMPTOM RELIEF PO) Unknown at Unknown time  Yes No   Sig: Take 1 capsule by mouth Not to exceed 8 tablets/24 hours   acetaminophen (TYLENOL) 500 mg tablet Unknown at Unknown time  Yes No   Sig: Take 500 mg by mouth every 6 (six) hours as needed for mild pain   docusate sodium (COLACE) 100 mg capsule 7/26/2022 at Unknown time  No Yes   Sig: Take 1 capsule (100 mg total) by mouth 2 (two) times a day   ferrous gluconate (FERGON) 324 mg tablet 7/26/2022 at Unknown time  No Yes   Sig: Take 1 tablet (324 mg total) by mouth 2 (two) times a day before meals   folic acid (FOLVITE) 1 mg tablet 7/26/2022 at Unknown time  Yes Yes   Sig: Take by mouth daily   furosemide (LASIX) 20 mg tablet 7/26/2022 at Unknown time  Yes Yes   Sig: Take 20 mg by mouth daily   gabapentin (NEURONTIN) 100 mg capsule Unknown at Unknown time  Yes No   Sig: Take 100 mg by mouth in the morning and 100 mg in the evening and 100 mg before bedtime  glucose blood (Glucocard Vital Test) test strip 7/26/2022 at Unknown time  No Yes   Sig: Use 1 each 4 (four) times a day (before meals and at bedtime) Use as instructed   hydroxychloroquine (PLAQUENIL) 200 mg tablet 7/26/2022 at Unknown time  Yes Yes   Sig: Take 200 mg by mouth 2 (two) times a day with meals   insulin detemir (LEVEMIR) 100 units/mL subcutaneous injection 7/26/2022 at Unknown time  No Yes   Sig: Inject 20 Units under the skin daily at bedtime   Patient taking differently: Inject 10 Units under the skin daily at bedtime   levothyroxine 50 mcg tablet 7/26/2022 at Unknown time  Yes Yes   Sig: Take 50 mcg by mouth in the morning  losartan (COZAAR) 50 mg tablet 7/26/2022 at Unknown time  Yes Yes   Sig: Take 50 mg by mouth in the morning  methotrexate 2 5 mg tablet 7/26/2022 at Unknown time  Yes Yes   Sig: Take 2 5 mg by mouth once a week Sundays   metoprolol tartrate (LOPRESSOR) 25 mg tablet 7/26/2022 at Unknown time  Yes Yes   Sig: Take 25 mg by mouth every 12 (twelve) hours   potassium chloride (Klor-Con) 10 mEq tablet 7/26/2022 at Unknown time  Yes Yes   Sig: Take 10 mEq by mouth in the morning      Facility-Administered Medications: None       Past Medical History:   Diagnosis Date    D[facility] ([facility] [place] [facility]     Diabetes mellitus ([facility] [place] [facility] )     Disease of thyroid gland     Hypertension     Rheumatoid arthritis ([facility] )        No past surgical history on file  No family history on file  I have reviewed and agree with the history as documented      E-Cigarette/Vaping    E-Cigarette Use Never User      E-Cigarette/Vaping Substances    Nicotine No     THC No     CBD No     Flavoring No     Other No     Unknown No      Social History     Tobacco Use    Smoking status: Never Smoker    Smokeless tobacco: Never Used   Vaping Use    Vaping Use: Never used   Substance Use Topics    Alcohol use: Not Currently    Drug use: Never       Review of Systems   Unable to perform ROS: Dementia   All other systems reviewed and are negative  Physical Exam  Physical Exam  Constitutional:       General: She is not in acute distress  Appearance: She is well-developed  She is not ill-appearing, toxic-appearing or diaphoretic  HENT:      Head: Normocephalic and atraumatic  Nose: Nose normal       Mouth/Throat:      Pharynx: No oropharyngeal exudate  Eyes:      General: No scleral icterus  Right eye: No discharge  Left eye: No discharge  Extraocular Movements: Extraocular movements intact  Conjunctiva/sclera: Conjunctivae normal       Pupils: Pupils are equal, round, and reactive to light  Pupils are equal    Neck:      Vascular: No JVD  Trachea: No tracheal deviation  Cardiovascular:      Rate and Rhythm: Normal rate and regular rhythm  Heart sounds: Normal heart sounds  No murmur heard  No friction rub  No gallop  Pulmonary:      Effort: Pulmonary effort is normal  No respiratory distress  Breath sounds: Normal breath sounds  No stridor  No wheezing, rhonchi or rales  Chest:      Chest wall: No tenderness  Abdominal:      General: Bowel sounds are normal  There is no distension  Palpations: Abdomen is soft  There is no mass  Tenderness: There is no abdominal tenderness  There is no guarding or rebound  Hernia: No hernia is present  Musculoskeletal:         General: No swelling, tenderness or deformity  Normal range of motion  Cervical back: Normal range of motion and neck supple  Lymphadenopathy:      Cervical: No cervical adenopathy  Skin:     General: Skin is warm  Capillary Refill: Capillary refill takes less than 2 seconds  Coloration: Skin is not cyanotic or pale  Findings: No erythema or rash     Neurological:      Mental Status: She is alert and oriented to person, place, and time       GCS: GCS eye subscore is 4  GCS verbal subscore is 4  GCS motor subscore is 6  Cranial Nerves: No cranial nerve deficit, dysarthria or facial asymmetry  Sensory: No sensory deficit  Motor: No abnormal muscle tone  Coordination: Coordination normal    Psychiatric:         Mood and Affect: Mood normal          Behavior: Behavior normal          Thought Content: Thought content normal          Judgment: Judgment normal          Vital Signs  ED Triage Vitals [07/27/22 0321]   Temperature Pulse Respirations Blood Pressure SpO2   97 8 °F (36 6 °C) 86 18 (!) 208/81 96 %      Temp Source Heart Rate Source Patient Position - Orthostatic VS BP Location FiO2 (%)   Tympanic Monitor Lying Right arm --      Pain Score       --           Vitals:    07/27/22 0321 07/27/22 0513   BP: (!) 208/81 152/77   Pulse: 86 84   Patient Position - Orthostatic VS: Lying          Visual Acuity      ED Medications  Medications   cephalexin (KEFLEX) capsule 500 mg (500 mg Oral Not Given 7/27/22 0800)   sodium chloride 0 9 % bolus 1,000 mL (0 mL Intravenous Stopped 7/27/22 0720)   insulin regular (HumuLIN R,NovoLIN R) injection 6 Units (6 Units Intravenous Given 7/27/22 0510)       Diagnostic Studies  Results Reviewed     Procedure Component Value Units Date/Time    HS Troponin I 2hr [[id number]]  (Abnormal) Collected: 07/27/22 0729    Lab Status: Final result Specimen: Blood from Arm, Left Updated: 07/27/22 0801     hs TnI 2hr 109 ng/L      Delta 2hr hsTnI -8 ng/L     Urine Microscopic [[id number]]  (Abnormal) Collected: 07/27/22 0646    Lab Status: Final result Specimen: Urine, Clean Catch Updated: 07/27/22 0701     RBC, UA 10-20 /hpf      WBC, UA 10-20 /hpf      Epithelial Cells None Seen /hpf      Bacteria, UA Moderate /hpf     Urine culture [[id number]] Collected: 07/27/22 0646    Lab Status:  In process Specimen: Urine, Clean Catch Updated: 07/27/22 0701    UA w Reflex to Microscopic w Reflex to Culture [[id number]] (Abnormal) Collected: 07/27/22 0646    Lab Status: Final result Specimen: Urine, Clean Catch Updated: 07/27/22 0653     Color, UA Straw     Clarity, UA Slightly Cloudy     Specific Gravity, UA 1 025     pH, UA 6 0     Leukocytes, UA 1+     Nitrite, UA Negative     Protein, UA 1+ mg/dl      Glucose, UA 2+ mg/dl      Ketones, UA Negative mg/dl      Urobilinogen, UA 0 2 E U /dl      Bilirubin, UA Negative     Occult Blood, UA 3+    Fingerstick Glucose (POCT) [[id number]]  (Abnormal) Collected: 07/27/22 0623    Lab Status: Final result Updated: 07/27/22 0624     POC Glucose 363 mg/dl     HS Troponin I 4hr [[id number]]     Lab Status: No result Specimen: Blood     HS Troponin 0hr (reflex protocol) [[id number]]  (Abnormal) Collected: 07/27/22 0329    Lab Status: Final result Specimen: Blood from Arm, Left Updated: 07/27/22 0407     hs TnI 0hr 117 ng/L     B-Type Natriuretic Peptide(BNP) AN, CA, EA Campuses Only [[id number]]  (Abnormal) Collected: 07/27/22 0329    Lab Status: Final result Specimen: Blood from Arm, Left Updated: 07/27/22 0406      pg/mL     Lipase [[id number]]  (Normal) Collected: 07/27/22 0329    Lab Status: Final result Specimen: Blood from Arm, Left Updated: 07/27/22 0400     Lipase 37 u/L     Lactic acid [[id number]]  (Normal) Collected: 07/27/22 0329    Lab Status: Final result Specimen: Blood from Arm, Left Updated: 07/27/22 0400     LACTIC ACID 1 1 mmol/L     Narrative:      Result may be elevated if tourniquet was used during collection      CMP [[id number]]  (Abnormal) Collected: 07/27/22 0329    Lab Status: Final result Specimen: Blood from Arm, Left Updated: 07/27/22 0400     Sodium 135 mmol/L      Potassium 4 8 mmol/L      Chloride 103 mmol/L      CO2 25 mmol/L      ANION GAP 7 mmol/L      BUN 48 mg/dL      Creatinine 1 69 mg/dL      Glucose 438 mg/dL      Calcium 9 2 mg/dL      Corrected Calcium 9 8 mg/dL      AST 17 U/L      ALT 14 U/L      Alkaline Phosphatase 83 U/L      Total Protein 7 2 g/dL      Albumin 3 3 g/dL      Total Bilirubin 0 34 mg/dL      eGFR 26 ml/min/1 73sq m     Narrative:      National Kidney Disease Foundation guidelines for Chronic Kidney Disease (CKD):     Stage 1 with normal or high GFR (GFR > 90 mL/min/1 73 square meters)    Stage 2 Mild CKD (GFR = 60-89 mL/min/1 73 square meters)    Stage 3A Moderate CKD (GFR = 45-59 mL/min/1 73 square meters)    Stage 3B Moderate CKD (GFR = 30-44 mL/min/1 73 square meters)    Stage 4 Severe CKD (GFR = 15-29 mL/min/1 73 square meters)    Stage 5 End Stage CKD (GFR <15 mL/min/1 73 square meters)  Note: GFR calculation is accurate only with a steady state creatinine    Magnesium [[id number]]  (Normal) Collected: 07/27/22 0329    Lab Status: Final result Specimen: Blood from Arm, Left Updated: 07/27/22 0400     Magnesium 1 9 mg/dL     Beta Hydroxybutyrate [[id number]]  (Normal) Collected: 07/27/22 0329    Lab Status: Final result Specimen: Blood from Arm, Left Updated: 07/27/22 0341     BETA-HYDROXYBUTYRATE 0 5 mmol/L     CBC and differential [[id number]]  (Abnormal) Collected: 07/27/22 0329    Lab Status: Final result Specimen: Blood from Arm, Left Updated: 07/27/22 0339     WBC 7 83 Thousand/uL      RBC 3 55 Million/uL      Hemoglobin 10 1 g/dL      Hematocrit 33 2 %      MCV 94 fL      MCH 28 5 pg      MCHC 30 4 g/dL      RDW 15 5 %      MPV 10 1 fL      Platelets 103 Thousands/uL      nRBC 0 /100 WBCs      Neutrophils Relative 69 %      Immat GRANS % 1 %      Lymphocytes Relative 23 %      Monocytes Relative 7 %      Eosinophils Relative 0 %      Basophils Relative 0 %      Neutrophils Absolute 5 42 Thousands/µL      Immature Grans Absolute 0 08 Thousand/uL      Lymphocytes Absolute 1 78 Thousands/µL      Monocytes Absolute 0 52 Thousand/µL      Eosinophils Absolute 0 00 Thousand/µL      Basophils Absolute 0 03 Thousands/µL     Blood gas, venous [[id number]]  (Abnormal) Collected: 07/27/22 0329    Lab Status: Final result Specimen: Blood from Arm, Left Updated: 07/27/22 0339     pH, Kranthi 7 346     pCO2, Kranthi 38 5 mm Hg      pO2, Kranthi 46 8 mm Hg      HCO3, Kranthi 20 6 mmol/L      Base Excess, Kranthi -4 6 mmol/L      O2 Content, Kranthi 12 5 ml/dL      O2 HGB, VENOUS 79 2 %                  CT chest abdomen pelvis wo contrast   Final Result by Sharmila Freire DO (07/27 0550)   1  No traumatic solid or visceral organ injury, however intravenous contrast material was not administered  This limits evaluation for traumatic solid or visceral organ injury  If there is continued concern for traumatic intra-abdominal injury, recommend repeat examination with intravenous contrast material and/or follow-up surgical evaluation  2   Vague, patchy alveolar infiltrates in the lower lobes of the lungs bilaterally, left greater than right and lingula  Differential includes atelectasis versus pneumonia  Correlate for aspiration  If warranted, consider follow-up pulmonology    consultation  3   Asymmetric nodular soft tissue density in the left breast   Correlate for left breast mass  If warranted, consider follow-up outpatient mammography  4   Stable indeterminate cystic right renal lesion, likely complex cyst   If warranted, consider follow-up outpatient renal ultrasound  5   Colonic diverticulosis  6   Distended urinary bladder  Although the findings are likely related to the nonfasting state of the patient, correlate for neurogenic bladder  If warranted, consider follow-up urology consultation and/or cystoscopy with bladder urodynamics  Workstation performed: [id number]         CT head without contrast   Final Result by Sharmila Freire DO (07/27 0520)   Stable cerebral atrophy with chronic small vessel ischemic white matter disease  No acute intracranial abnormality             If there is continued concern for acute intracranial injury, recommend follow-up neurology consultation and/or MRI of the brain with T2 gradient echo weighted sequencing and FLAIR weighted sequencing  Workstation performed: [id number]         CT spine cervical without contrast   Final Result by Patrica Bermudez DO (07/27 0528)   Stable severe degenerative changes of the cervical spine, most pronounced C3-C4 with severe central stenosis  No acute cervical spine fracture or traumatic malalignment  If there is continued concern for acute spinal cord injury and/or cord contusion, particularly at C3-C4, a follow-up neurology consultation and/or MRI of the cervical spine is recommended  Workstation performed: [id number]                    Procedures  Procedures         ED Course  ED Course as of 07/27/22 0806   Wed Jul 27, 2022   0321 Patient seen and evaluated    Not on blood thinners  No overt signs of trauma  Will not make patient a trauma eval, however she will get a trauma workup here in the ER  At she is more confused than usual per nursing home, she has significant Alzheimer's dementia and her blood sugars are very elevated today   0425 BNP(!): 263   0426 CMP(!)   0426 Blood gas, venous(!)   0426 Magnesium: 1 9   0426 hs TnI 0hr(!): 117   0426 CBC and differential(!)   0426 Lipase: 37   0426 LACTIC ACID: 1 1   0426 BETA-HYDROXYBUTYRATE: 0 5   0426 Labs all appear essentially at baseline     elevated troponin, which is not much different from her troponin a month ago    Glucose is elevated, but no gap  No acidosis   0600 CT head:     IMPRESSION:  Stable cerebral atrophy with chronic small vessel ischemic white matter disease  No acute intracranial abnormality            0600 CT c spine:   IMPRESSION:  Stable severe degenerative changes of the cervical spine, most pronounced C3-C4 with severe central stenosis      No acute cervical spine fracture or traumatic malalignment    0601 CT Chest ab/pelv:     IMPRESSION:  1    No traumatic solid or visceral organ injury, however intravenous contrast material was not administered  This limits evaluation for traumatic solid or visceral organ injury      If there is continued concern for traumatic intra-abdominal injury, recommend repeat examination with intravenous contrast material and/or follow-up surgical evaluation      2   Vague, patchy alveolar infiltrates in the lower lobes of the lungs bilaterally, left greater than right and lingula  Differential includes atelectasis versus pneumonia  Correlate for aspiration  If warranted, consider follow-up pulmonology   consultation      3  Asymmetric nodular soft tissue density in the left breast   Correlate for left breast mass  If warranted, consider follow-up outpatient mammography      4   Stable indeterminate cystic right renal lesion, likely complex cyst   If warranted, consider follow-up outpatient renal ultrasound      5  Colonic diverticulosis      6  Distended urinary bladder  Although the findings are likely related to the nonfasting state of the patient, correlate for neurogenic bladder  If warranted, consider follow-up urology consultation and/or cystoscopy with bladder urodynamics  0601 CT scans reviewed  Patient has no signs of pneumonia  O2 sats just fine  Respiratory Rate normal   Clinical exam is not consistent with pneumonia   0657 UA w Reflex to Microscopic w Reflex to Culture(!)   0723 Urine culture reviewed from last month  E coli    Susceptible to Bactrim, ceftriaxone   0804 hs TnI 2hr(!): 109  Troponin is flat from earlier but, as well as 1 month ago  EKG is nonischemic    She is nontoxic  Appears at baseline, and this corroborates with her prior charts  Creatinine at baseline, as is hemoglobin and white count  Mild leukocytosis    Will DC back to nursing home                                             MDM    Disposition  Final diagnoses:   Fall, initial encounter   Hyperglycemia due to diabetes mellitus ([facility] )   UTI (urinary tract infection)     Time reflects when diagnosis was documented in both MDM as applicable and the Disposition within this note     Time User Action Codes Description Comment    7/27/2022  4:28 AM Gibson Siad Add [H76  XXXA] Fall, initial encounter     7/27/2022  4:28 AM Gibson Siad Add [E11 65] Hyperglycemia due to diabetes mellitus ([facility] )     7/27/2022  4:28 AM Liya Siad Add [R77 8] Elevated troponin     7/27/2022  6:35 AM Gibson Siad Add [R27 0] Ataxia     7/27/2022  8:05 AM Liya Siad Remove [R27 0] Ataxia     7/27/2022  8:05 AM Liya Siad Remove [R77 8] Elevated troponin     7/27/2022  8:05 AM Gibson Siad Add [N39 0] UTI (urinary tract infection)     7/27/2022  8:06 AM Liya Siad Modify [N39 0] UTI (urinary tract infection)       ED Disposition     ED Disposition   Discharge    Condition   Stable    Date/Time   Wed Jul 27, 2022  8:05 AM    Comment              Follow-up Information    None         Patient's Medications   Discharge Prescriptions    CEPHALEXIN (KEFLEX) 500 MG CAPSULE    Take 1 capsule (500 mg total) by mouth every 8 (eight) hours for 7 days       Start Date: 7/27/2022 End Date: 8/3/2022       Order Dose: 500 mg       Quantity: 21 capsule    Refills: 0       No discharge procedures on file      PDMP Review     None          ED Provider  Electronically Signed by           Ellen Chand MD  07/27/22 [address], [place]  07/27/22 0231

## 2022-07-27 NOTE — ED NOTES
Spoke with Lucian Juan from [facility], given update on patient status and disposition       Nicky Soriano RN  07/27/22 3733

## 2022-07-27 NOTE — DISCHARGE INSTRUCTIONS
RETURN IF WORSE IN ANY WAY:   WORSENING SYMPOTMS  CHEST PAIN,   SHORTNESS OF BREATH,   FEVER OR FLU LIKE SYMPTOMS,   OR NEW AND CONCERNING SYMPTOMS SIGNS OR SYMPTOMS      PLEASE CALL YOUR PRIMARY DOCTOR IN THE MORNING TO SET UP FOLLOW UP   PLEASE REVIEW THE WORK UP RESULTS WITH YOUR DOCTOR

## 2022-07-27 NOTE — ED PROCEDURE NOTE
PROCEDURE  ECG 12 Lead Documentation Only    Date/Time: 7/27/2022 3:20 AM  Performed by: Chio Rosario MD  Authorized by: Chio Rosario MD     Indications / Diagnosis:  Confused  ECG reviewed by me, the ED Provider: yes    Patient location:  ED  Previous ECG:     Comparison to cardiac monitor: Yes    Rate:     ECG rate:  86    ECG rate assessment: normal    Rhythm:     Rhythm: sinus rhythm    Ectopy:     Ectopy: none    QRS:     QRS axis:  Normal    QRS intervals:  Normal  Conduction:     Conduction: normal    ST segments:     ST segments:  Non-specific  T waves:     T waves: non-specific           Chio Rosario MD  07/27/22 2939

## 2022-07-27 NOTE — TELEPHONE ENCOUNTER
7/27 - spoke to Sage Mendoza in regards to the patients listed - I explained to her we cannot go to a personal care home  [facility] is following up with her as well

## 2022-07-29 LAB — GLUCOSE SERPL-MCNC: >500 MG/DL (ref 65–140)

## 2022-07-30 LAB
BACTERIA UR CULT: ABNORMAL
BACTERIA UR CULT: ABNORMAL

## 2022-07-31 RX ORDER — GRANULES FOR ORAL 3 G/1
3 POWDER ORAL ONCE
Qty: 3 G | Refills: 0 | Status: SHIPPED | OUTPATIENT
Start: 2022-07-31 | End: 2022-07-31

## 2022-08-01 PROCEDURE — U0005 INFEC AGEN DETEC AMPLI PROBE: HCPCS | Performed by: INTERNAL MEDICINE

## 2022-08-01 PROCEDURE — U0003 INFECTIOUS AGENT DETECTION BY NUCLEIC ACID (DNA OR RNA); SEVERE ACUTE RESPIRATORY SYNDROME CORONAVIRUS 2 (SARS-COV-2) (CORONAVIRUS DISEASE [COVID-19]), AMPLIFIED PROBE TECHNIQUE, MAKING USE OF HIGH THROUGHPUT TECHNOLOGIES AS DESCRIBED BY CMS-2020-01-R: HCPCS | Performed by: INTERNAL MEDICINE

## 2022-08-02 ENCOUNTER — LAB REQUISITION (OUTPATIENT)
Dept: LAB | Facility: HOSPITAL | Age: 87
End: 2022-08-02
Payer: MEDICARE

## 2022-08-02 DIAGNOSIS — Z11.59 ENCOUNTER FOR SCREENING FOR OTHER VIRAL DISEASES: ICD-10-CM

## 2022-08-02 DIAGNOSIS — Z03.818 ENCOUNTER FOR OBSERVATION FOR SUSPECTED EXPOSURE TO OTHER BIOLOGICAL AGENTS RULED OUT: ICD-10-CM

## 2022-08-02 LAB — SARS-COV-2 RNA RESP QL NAA+PROBE: NEGATIVE

## 2022-08-08 ENCOUNTER — LAB REQUISITION (OUTPATIENT)
Dept: LAB | Facility: HOSPITAL | Age: 87
End: 2022-08-08
Payer: MEDICARE

## 2022-08-08 DIAGNOSIS — Z11.59 ENCOUNTER FOR SCREENING FOR OTHER VIRAL DISEASES: ICD-10-CM

## 2022-08-08 DIAGNOSIS — Z03.818 ENCOUNTER FOR OBSERVATION FOR SUSPECTED EXPOSURE TO OTHER BIOLOGICAL AGENTS RULED OUT: ICD-10-CM

## 2022-08-08 LAB — SARS-COV-2 RNA RESP QL NAA+PROBE: NEGATIVE

## 2022-08-08 PROCEDURE — U0005 INFEC AGEN DETEC AMPLI PROBE: HCPCS | Performed by: INTERNAL MEDICINE

## 2022-08-08 PROCEDURE — U0003 INFECTIOUS AGENT DETECTION BY NUCLEIC ACID (DNA OR RNA); SEVERE ACUTE RESPIRATORY SYNDROME CORONAVIRUS 2 (SARS-COV-2) (CORONAVIRUS DISEASE [COVID-19]), AMPLIFIED PROBE TECHNIQUE, MAKING USE OF HIGH THROUGHPUT TECHNOLOGIES AS DESCRIBED BY CMS-2020-01-R: HCPCS | Performed by: INTERNAL MEDICINE

## 2022-08-14 ENCOUNTER — HOSPITAL ENCOUNTER (EMERGENCY)
Facility: HOSPITAL | Age: 87
Discharge: HOME | End: 2022-08-14
Attending: INTERNAL MEDICINE
Payer: MEDICARE

## 2022-08-14 ENCOUNTER — APPOINTMENT (EMERGENCY)
Dept: CT IMAGING | Facility: HOSPITAL | Age: 87
End: 2022-08-14
Payer: MEDICARE

## 2022-08-14 ENCOUNTER — APPOINTMENT (EMERGENCY)
Dept: RADIOLOGY | Facility: HOSPITAL | Age: 87
End: 2022-08-14
Payer: MEDICARE

## 2022-08-14 VITALS
DIASTOLIC BLOOD PRESSURE: 68 MMHG | WEIGHT: 160 LBS | HEIGHT: 64 IN | SYSTOLIC BLOOD PRESSURE: 164 MMHG | BODY MASS INDEX: 27.31 KG/M2 | HEART RATE: 59 BPM | RESPIRATION RATE: 18 BRPM | OXYGEN SATURATION: 97 % | TEMPERATURE: 97.8 F

## 2022-08-14 DIAGNOSIS — S09.90XA HEAD INJURY: ICD-10-CM
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
## 2022-08-14 LAB
GLUCOSE SERPL-MCNC: 151 MG/DL (ref 65–140)
GLUCOSE SERPL-MCNC: 166 MG/DL (ref 65–140)

## 2022-08-14 PROCEDURE — 96372 THER/PROPH/DIAG INJ SC/IM: CPT

## 2022-08-14 PROCEDURE — 70450 CT HEAD/BRAIN W/O DYE: CPT

## 2022-08-14 PROCEDURE — 99285 EMERGENCY DEPT VISIT HI MDM: CPT | Performed by: INTERNAL MEDICINE

## 2022-08-14 PROCEDURE — G1004 CDSM NDSC: HCPCS

## 2022-08-14 PROCEDURE — 82948 REAGENT STRIP/BLOOD GLUCOSE: CPT

## 2022-08-14 PROCEDURE — 93005 ELECTROCARDIOGRAM TRACING: CPT

## 2022-08-14 PROCEDURE — 73030 X-RAY EXAM OF SHOULDER: CPT

## 2022-08-14 PROCEDURE — 72125 CT NECK SPINE W/O DYE: CPT

## 2022-08-14 PROCEDURE — 72170 X-RAY EXAM OF PELVIS: CPT

## 2022-08-14 PROCEDURE — 71045 X-RAY EXAM CHEST 1 VIEW: CPT

## 2022-08-14 PROCEDURE — 99284 EMERGENCY DEPT VISIT MOD MDM: CPT

## 2022-08-14 RX ORDER — INSULIN LISPRO 100 [IU]/ML
5 INJECTION, SOLUTION INTRAVENOUS; SUBCUTANEOUS
Status: DISCONTINUED | OUTPATIENT
Start: 2022-08-14 | End: 2022-08-14 | Stop reason: HOSPADM

## 2022-08-14 RX ADMIN — INSULIN LISPRO 5 UNITS: 100 INJECTION, SOLUTION INTRAVENOUS; SUBCUTANEOUS at 17:06

## 2022-08-14 NOTE — ED PROVIDER NOTES
History  Chief Complaint   Patient presents with    Fall     Fall while attempting to stand up  Fell backwards and hit the back of the head  Patient complains of bilateral shoulder pain  Lives at [facility]  51-year-old female resident of a personal care home with underlying dementia, history of rheumatoid arthritis apparently fell from standing position injuring the back of her head  She is not on any blood thinners  There is no loss of consciousness  Simple loss of balance  She complains of bilateral shoulder pain but otherwise no other discomfort  She presented with a cervical collar, was poorly fitting and her neck was cleared by nexus criteria, so it was removed by me  Prior to Admission Medications   Prescriptions Last Dose Informant Patient Reported? Taking?    Bioflavonoid Products (WHITNEY C PO) 8/14/2022 at Unknown time  Yes Yes   Sig: Take 1,000 mg by mouth in the morning   Insulin Lispro (HumaLOG) 100 units/mL cartridge for injection   No Yes   Sig: Inject 5 Units under the skin 3 (three) times a day with meals   Insulin Pen Needle 30G X 8 MM MISC 8/13/2022 at Unknown time  Yes Yes   Sig: TO BE USED WITH LEVEMIR INSULIN AT BEDTIME   Loperamide-Simethicone (IMODIUM MULTI-SYMPTOM RELIEF PO)   Yes No   Sig: Take 1 capsule by mouth Not to exceed 8 tablets/24 hours   acetaminophen (TYLENOL) 500 mg tablet   Yes No   Sig: Take 500 mg by mouth every 6 (six) hours as needed for mild pain   docusate sodium (COLACE) 100 mg capsule   No No   Sig: Take 1 capsule (100 mg total) by mouth 2 (two) times a day   ferrous gluconate (FERGON) 324 mg tablet 8/14/2022 at Unknown time  No Yes   Sig: Take 1 tablet (324 mg total) by mouth 2 (two) times a day before meals   folic acid (FOLVITE) 1 mg tablet 8/14/2022 at Unknown time  Yes Yes   Sig: Take by mouth daily   furosemide (LASIX) 20 mg tablet 8/14/2022 at Unknown time  Yes Yes   Sig: Take 20 mg by mouth daily   gabapentin (NEURONTIN) 100 mg capsule 8/14/2022 at Unknown time  Yes Yes   Sig: Take 100 mg by mouth in the morning and 100 mg in the evening and 100 mg before bedtime  glucose blood (Glucocard Vital Test) test strip   No No   Sig: Use 1 each 4 (four) times a day (before meals and at bedtime) Use as instructed   hydroxychloroquine (PLAQUENIL) 200 mg tablet 8/14/2022 at Unknown time  Yes Yes   Sig: Take 200 mg by mouth 2 (two) times a day with meals   insulin detemir (LEVEMIR) 100 units/mL subcutaneous injection   No No   Sig: Inject 20 Units under the skin daily at bedtime   Patient taking differently: Inject 10 Units under the skin daily at bedtime   levothyroxine 50 mcg tablet 8/14/2022 at Unknown time  Yes Yes   Sig: Take 50 mcg by mouth in the morning  losartan (COZAAR) 50 mg tablet 8/14/2022 at Unknown time  Yes Yes   Sig: Take 50 mg by mouth in the morning  methotrexate 2 5 mg tablet 8/14/2022 at Unknown time  Yes Yes   Sig: Take 2 5 mg by mouth once a week Sundays   metoprolol tartrate (LOPRESSOR) 25 mg tablet 8/14/2022 at Unknown time  Yes Yes   Sig: Take 25 mg by mouth every 12 (twelve) hours   potassium chloride (Klor-Con) 10 mEq tablet 8/14/2022 at Unknown time  Yes Yes   Sig: Take 10 mEq by mouth in the morning      Facility-Administered Medications: None       Past Medical History:   Diagnosis Date    Dementia ([facility] )     Diabetes mellitus ([facility] )     Disease of thyroid gland     Hypertension     Rheumatoid arthritis ([facility] )        History reviewed  No pertinent surgical history  History reviewed  No pertinent family history  I have reviewed and agree with the history as documented      E-Cigarette/Vaping    E-Cigarette Use Never User      E-Cigarette/Vaping Substances    Nicotine No     THC No     CBD No     Flavoring No     Other No     Unknown No      Social History     Tobacco Use    Smoking status: Never Smoker    Smokeless tobacco: Never Used   Vaping Use    Vaping Use: Never used   Substance Use Topics  Alcohol use: Not Currently    Drug use: Never       Review of Systems   Unable to perform ROS: Other (Extremely hard of hearing)   Constitutional: Negative for chills and fever  HENT: Negative for rhinorrhea and sore throat  Eyes: Negative for visual disturbance  Respiratory: Negative for cough and shortness of breath  Cardiovascular: Negative for chest pain and leg swelling  Gastrointestinal: Negative for abdominal pain, diarrhea, nausea and vomiting  Genitourinary: Negative for dysuria  Musculoskeletal: Positive for arthralgias and myalgias  Negative for back pain  Skin: Negative for rash  Neurological: Negative for dizziness and headaches  Psychiatric/Behavioral: Negative for confusion  All other systems reviewed and are negative  Physical Exam  Physical Exam  Vitals and nursing note reviewed  Constitutional:       Appearance: Normal appearance  She is well-developed  Comments: Age-appropriate   HENT:      Head:      Comments: Hematoma the posterior aspect of the scalp     Right Ear: Tympanic membrane normal       Left Ear: Tympanic membrane normal       Nose: Nose normal       Mouth/Throat:      Pharynx: No oropharyngeal exudate  Eyes:      General: No scleral icterus  Conjunctiva/sclera: Conjunctivae normal       Pupils: Pupils are equal, round, and reactive to light  Neck:      Vascular: No JVD  Trachea: No tracheal deviation  Comments: Nexus criteria negative  Cardiovascular:      Rate and Rhythm: Normal rate and regular rhythm  Pulses: Normal pulses  Heart sounds: Normal heart sounds  No murmur heard  Pulmonary:      Effort: Pulmonary effort is normal  No respiratory distress  Breath sounds: Normal breath sounds  No wheezing or rales  Abdominal:      General: Bowel sounds are normal       Palpations: Abdomen is soft  Tenderness: There is no abdominal tenderness  There is no guarding     Musculoskeletal:         General: Normal range of motion  Cervical back: Normal range of motion and neck supple  No tenderness  Comments: Bilateral shoulder tenderness  No pelvic tenderness   Skin:     General: Skin is warm and dry  Coloration: Skin is pale  Comments: No pelvic tenderness   Neurological:      General: No focal deficit present  Mental Status: She is alert and oriented to person, place, and time  Cranial Nerves: No cranial nerve deficit  Sensory: No sensory deficit  Motor: No abnormal muscle tone        Comments: 5/5 motor, nl sens   Psychiatric:         Behavior: Behavior normal          Vital Signs  ED Triage Vitals   Temperature Pulse Respirations Blood Pressure SpO2   08/14/22 1024 08/14/22 1024 08/14/22 1024 08/14/22 1024 08/14/22 1024   97 8 °F (36 6 °C) 69 18 (!) 196/96 97 %      Temp src Heart Rate Source Patient Position - Orthostatic VS BP Location FiO2 (%)   -- 08/14/22 1121 08/14/22 1121 08/14/22 1121 --    Monitor Sitting Left arm       Pain Score       08/14/22 1323       No Pain           Vitals:    08/14/22 1024 08/14/22 1121 08/14/22 1323   BP: (!) 196/96 157/97 164/68   Pulse: 69 61 59   Patient Position - Orthostatic VS:  Sitting Sitting         Visual Acuity  Visual Acuity    Flowsheet Row Most Recent Value   L Pupil Size (mm) 3   R Pupil Size (mm) 3          ED Medications  Medications   insulin lispro (HumaLOG) 100 units/mL subcutaneous injection 5 Units (has no administration in time range)       Diagnostic Studies  Results Reviewed     Procedure Component Value Units Date/Time    Fingerstick Glucose (POCT) [[id number]]  (Abnormal) Collected: 08/14/22 1322    Lab Status: Final result Updated: 08/14/22 1324     POC Glucose 166 mg/dl     UA w Reflex to Microscopic w Reflex to Culture [[id number]]     Lab Status: No result Specimen: Urine, Clean Catch                  CT head without contrast   ED Interpretation by Janes Morse DO (08/14 1251)   Atrophy, no intracranial hemorrhage      Final Result by Aurelio Mendez MD (08/14 1305)      No acute intracranial CT abnormality  Workstation performed: [id number]         CT cervical spine without contrast   ED Interpretation by Pedro Pablo Davis DO (08/14 1315)   No acute fracture deformity      Final Result by Aurelio Mendez MD (08/14 1313)      No acute cervical spine fracture or traumatic malalignment  Workstation performed: [id number]         XR shoulder 2+ views LEFT   ED Interpretation by Pedro Pablo Davis DO (08/14 1125)   No acute fracture or deformity, extensive degenerative changes      XR shoulder 2+ views RIGHT   ED Interpretation by Pedro Pablo Davis DO (08/14 1126)   No acute fracture or deformity, extensive degenerative changes      XR chest 1 view portable   ED Interpretation by Pedro Pablo Davis DO (08/14 1126)   No active disease in the chest, no evidence of rib fracture or pneumothorax      Final Result by Nic Calzada MD (08/14 1136)      No acute cardiopulmonary disease  Workstation performed: [id number]         XR pelvis ap only 1 or 2 views   ED Interpretation by Pedro Pablo Davis DO (08/14 1126)   No acute fracture or deformity                 Procedures  ECG 12 Lead Documentation Only    Date/Time: 8/14/2022 10:46 AM  Performed by: Pedro Pablo Davis DO  Authorized by: Pedro Pablo Davis DO     Indications / Diagnosis:  Fall  ECG reviewed by me, the ED Provider: yes    Patient location:  ED  Previous ECG:     Previous ECG:  Compared to current    Similarity:  No change  Interpretation:     Interpretation: abnormal    Rate:     ECG rate:  66    ECG rate assessment: normal    Rhythm:     Rhythm: sinus rhythm    Ectopy:     Ectopy: none    QRS:     QRS axis:  Left    QRS intervals:   Wide  Conduction:     Conduction: abnormal      Abnormal conduction: complete RBBB    ST segments:     ST segments:  Non-specific  T waves:     T waves: flattening               ED Course  ED Course as of 08/14/22 1646   Sun Aug 14, 2022   1237 Patient remains comfortable  Waiting CT scans   1259 Awaiting formal CT scan of cervical spine report before disposition  Again patient remains comfortable   1420 Patient will be picked up till 630 p m  SBIRT 20yo+    Flowsheet Row Most Recent Value   SBIRT (25 yo +)    In order to provide better care to our patients, we are screening all of our patients for alcohol and drug use  Would it be okay to ask you these screening questions? Yes Filed at: 08/14/2022 1058   Initial Alcohol Screen: US AUDIT-C     1  How often do you have a drink containing alcohol? 0 Filed at: 08/14/2022 1058   2  How many drinks containing alcohol do you have on a typical day you are drinking? 0 Filed at: 08/14/2022 1058   3a  Male UNDER 65: How often do you have five or more drinks on one occasion? 0 Filed at: 08/14/2022 1058   3b  FEMALE Any Age, or MALE 65+: How often do you have 4 or more drinks on one occassion? 0 Filed at: 08/14/2022 1058   Audit-C Score 0 Filed at: 08/14/2022 1058   RAMIREZ: How many times in the past year have you    Used an illegal drug or used a prescription medication for non-medical reasons? Never Filed at: 08/14/2022 1058                    MDM    Disposition  Final diagnoses:   Fall   Head injury     Time reflects when diagnosis was documented in both MDM as applicable and the Disposition within this note     Time User Action Codes Description Comment    8/14/2022  1:17 PM Colin Guaman Add [P15  XXXA] Fall     8/14/2022  1:17 PM Ravinder Jimena  [facility] [D36 [id number]] Head injury       ED Disposition     ED Disposition   Discharge    Condition   Stable    Date/Time   Sun Aug 14, 2022  1:17 PM    Comment   Kvng Celis discharge to home/self care                 Follow-up Information     Follow up With Specialties Details Why Contact Info    Mary Orourke, DO Family Medicine  As needed [facility] [address]  [phone]            Current Discharge Medication List      CONTINUE these medications which have NOT CHANGED    Details   Bioflavonoid Products (WHITNEY C PO) Take 1,000 mg by mouth in the morning      ferrous gluconate (FERGON) 324 mg tablet Take 1 tablet (324 mg total) by mouth 2 (two) times a day before meals  Qty: 60 tablet, Refills: 0    Associated Diagnoses: Anemia      folic acid (FOLVITE) 1 mg tablet Take by mouth daily      furosemide (LASIX) 20 mg tablet Take 20 mg by mouth daily      gabapentin (NEURONTIN) 100 mg capsule Take 100 mg by mouth in the morning and 100 mg in the evening and 100 mg before bedtime  hydroxychloroquine (PLAQUENIL) 200 mg tablet Take 200 mg by mouth 2 (two) times a day with meals      Insulin Lispro (HumaLOG) 100 units/mL cartridge for injection Inject 5 Units under the skin 3 (three) times a day with meals  Qty: 5 mL, Refills: 0    Associated Diagnoses: Hypoglycemia due to type 2 diabetes mellitus (HCC)      Insulin Pen Needle 30G X 8 MM MISC TO BE USED WITH LEVEMIR INSULIN AT BEDTIME      levothyroxine 50 mcg tablet Take 50 mcg by mouth in the morning  losartan (COZAAR) 50 mg tablet Take 50 mg by mouth in the morning        methotrexate 2 5 mg tablet Take 2 5 mg by mouth once a week Sundays      metoprolol tartrate (LOPRESSOR) 25 mg tablet Take 25 mg by mouth every 12 (twelve) hours      potassium chloride (Klor-Con) 10 mEq tablet Take 10 mEq by mouth in the morning      acetaminophen (TYLENOL) 500 mg tablet Take 500 mg by mouth every 6 (six) hours as needed for mild pain      docusate sodium (COLACE) 100 mg capsule Take 1 capsule (100 mg total) by mouth 2 (two) times a day  Qty: 60 capsule, Refills: 0    Associated Diagnoses: Anemia      glucose blood (Glucocard Vital Test) test strip Use 1 each 4 (four) times a day (before meals and at bedtime) Use as instructed  Qty: 100 strip, Refills: 0    Associated Diagnoses: Hypoglycemia due to type 2 diabetes mellitus ([facility] )      insulin detemir (LEVEMIR) 100 units/mL subcutaneous injection Inject 20 Units under the skin daily at bedtime  Qty: 10 mL, Refills: 0    Associated Diagnoses: Hypoglycemia due to type 2 diabetes mellitus (HCC)      Loperamide-Simethicone (IMODIUM MULTI-SYMPTOM RELIEF PO) Take 1 capsule by mouth Not to exceed 8 tablets/24 hours             No discharge procedures on file      PDMP Review     None          ED Provider  Electronically Signed by           Breanna Pena DO  08/14/22 2296

## 2022-08-14 NOTE — ED NOTES
Patient taken to bathroom at this time  Patient incontinent of urine  New brief placed  Patient requesting to stay in wheelchair at this time as stretcher is not comfortable       Jovanna Zhu RN  08/14/22 7073

## 2022-08-14 NOTE — ED NOTES
[facility] contacted for a ride home for patient   Waiting for [facility]  time     Cassie Carmona RN  08/14/22 8210

## 2022-08-14 NOTE — ED NOTES
Patient laying on stretcher at this time   Made aware of  time     Juan Grimaldo, MAURO  08/14/22 7138

## 2022-08-14 NOTE — ED NOTES
Patient transported to [address] [place] [address] [address] [place] [employer] [place], RN  08/14/22 9032

## 2022-08-14 NOTE — ED NOTES
Per provider, no trauma eval to be called  Assuming care of patient at this time         Nupur Santo [facility]  08/14/22 1051

## 2022-08-15 LAB
ATRIAL RATE: 66 BPM
P AXIS: 85 DEGREES
PR INTERVAL: 198 MS
QRS AXIS: -45 DEGREES
QRSD INTERVAL: 140 MS
QT INTERVAL: 458 MS
QTC INTERVAL: 480 MS
T WAVE AXIS: 14 DEGREES
VENTRICULAR RATE: 66 BPM

## 2022-08-15 PROCEDURE — 93010 ELECTROCARDIOGRAM REPORT: CPT | Performed by: INTERNAL MEDICINE

## 2022-08-22 ENCOUNTER — APPOINTMENT (OUTPATIENT)
Dept: LAB | Facility: CLINIC | Age: 87
End: 2022-08-22
Payer: MEDICARE

## 2022-10-11 PROBLEM — N39.0 UTI (URINARY TRACT INFECTION): Status: RESOLVED | Noted: 2022-06-27 | Resolved: 2022-10-11
